# Patient Record
Sex: FEMALE | Race: WHITE | NOT HISPANIC OR LATINO | Employment: OTHER | URBAN - METROPOLITAN AREA
[De-identification: names, ages, dates, MRNs, and addresses within clinical notes are randomized per-mention and may not be internally consistent; named-entity substitution may affect disease eponyms.]

---

## 2017-01-04 ENCOUNTER — ALLSCRIPTS OFFICE VISIT (OUTPATIENT)
Dept: OTHER | Facility: OTHER | Age: 57
End: 2017-01-04

## 2017-01-13 ENCOUNTER — ALLSCRIPTS OFFICE VISIT (OUTPATIENT)
Dept: OTHER | Facility: OTHER | Age: 57
End: 2017-01-13

## 2017-02-01 ENCOUNTER — ALLSCRIPTS OFFICE VISIT (OUTPATIENT)
Dept: OTHER | Facility: OTHER | Age: 57
End: 2017-02-01

## 2017-02-23 ENCOUNTER — ALLSCRIPTS OFFICE VISIT (OUTPATIENT)
Dept: OTHER | Facility: OTHER | Age: 57
End: 2017-02-23

## 2017-04-05 ENCOUNTER — ALLSCRIPTS OFFICE VISIT (OUTPATIENT)
Dept: OTHER | Facility: OTHER | Age: 57
End: 2017-04-05

## 2017-04-19 ENCOUNTER — GENERIC CONVERSION - ENCOUNTER (OUTPATIENT)
Dept: OTHER | Facility: OTHER | Age: 57
End: 2017-04-19

## 2017-05-03 ENCOUNTER — ALLSCRIPTS OFFICE VISIT (OUTPATIENT)
Dept: OTHER | Facility: OTHER | Age: 57
End: 2017-05-03

## 2017-07-03 ENCOUNTER — GENERIC CONVERSION - ENCOUNTER (OUTPATIENT)
Dept: OTHER | Facility: OTHER | Age: 57
End: 2017-07-03

## 2017-07-05 ENCOUNTER — ALLSCRIPTS OFFICE VISIT (OUTPATIENT)
Dept: OTHER | Facility: OTHER | Age: 57
End: 2017-07-05

## 2017-07-06 ENCOUNTER — GENERIC CONVERSION - ENCOUNTER (OUTPATIENT)
Dept: OTHER | Facility: OTHER | Age: 57
End: 2017-07-06

## 2017-07-06 ENCOUNTER — ALLSCRIPTS OFFICE VISIT (OUTPATIENT)
Dept: OTHER | Facility: OTHER | Age: 57
End: 2017-07-06

## 2017-08-02 ENCOUNTER — ALLSCRIPTS OFFICE VISIT (OUTPATIENT)
Dept: OTHER | Facility: OTHER | Age: 57
End: 2017-08-02

## 2017-08-16 ENCOUNTER — GENERIC CONVERSION - ENCOUNTER (OUTPATIENT)
Dept: OTHER | Facility: OTHER | Age: 57
End: 2017-08-16

## 2017-08-24 ENCOUNTER — ALLSCRIPTS OFFICE VISIT (OUTPATIENT)
Dept: OTHER | Facility: OTHER | Age: 57
End: 2017-08-24

## 2017-10-04 ENCOUNTER — ALLSCRIPTS OFFICE VISIT (OUTPATIENT)
Dept: OTHER | Facility: OTHER | Age: 57
End: 2017-10-04

## 2017-10-06 DIAGNOSIS — Z00.00 ENCOUNTER FOR GENERAL ADULT MEDICAL EXAMINATION WITHOUT ABNORMAL FINDINGS: ICD-10-CM

## 2017-10-12 ENCOUNTER — APPOINTMENT (OUTPATIENT)
Dept: LAB | Facility: HOSPITAL | Age: 57
End: 2017-10-12
Payer: MEDICARE

## 2017-10-12 DIAGNOSIS — Z00.00 ENCOUNTER FOR GENERAL ADULT MEDICAL EXAMINATION WITHOUT ABNORMAL FINDINGS: ICD-10-CM

## 2017-10-12 LAB — HCV AB SER QL: NORMAL

## 2017-10-12 PROCEDURE — 86803 HEPATITIS C AB TEST: CPT

## 2017-10-12 PROCEDURE — 36415 COLL VENOUS BLD VENIPUNCTURE: CPT

## 2017-10-13 ENCOUNTER — GENERIC CONVERSION - ENCOUNTER (OUTPATIENT)
Dept: OTHER | Facility: OTHER | Age: 57
End: 2017-10-13

## 2017-10-18 ENCOUNTER — ALLSCRIPTS OFFICE VISIT (OUTPATIENT)
Dept: OTHER | Facility: OTHER | Age: 57
End: 2017-10-18

## 2017-11-01 ENCOUNTER — ALLSCRIPTS OFFICE VISIT (OUTPATIENT)
Dept: OTHER | Facility: OTHER | Age: 57
End: 2017-11-01

## 2017-12-13 ENCOUNTER — GENERIC CONVERSION - ENCOUNTER (OUTPATIENT)
Dept: FAMILY MEDICINE CLINIC | Facility: CLINIC | Age: 57
End: 2017-12-13

## 2018-01-09 NOTE — PSYCH
Progress Note  Psychotherapy Provided [facility]: Group Therapy provided today  Goals addressed in session:   Goal #1  PAIN & POSITIVE COPING Group  D: Pt Homer Carr) was one of 4 pts  who participated in today's "Pain and Positive Coping" Group  Topics explored today included: Introductions ; Confidentiality; Each Group members' Sharing of their level of Pain today,and the Etiology of their Pain Condition(s); Psychoeducation by this Therapist re: the relationship of Pain+Depression+Anxiety; Group Members' [place] Sharing of their current stressors TODAY,and their body+mind Responses to same; Some Positive Coping Strategies they are using today,and their [place] support was given to each other  The last segment of today's group was comprised of Deep-breathing/ Relaxation/Guided Imagery/ and Mindfulness, accompanied by [employer] via CD  Pt Harold Meigs) participated very actively in group,and was quite verbal today  She said her Pain=5, mostly due to her Fibromyalgia today  She stated that it takes a LOT of effort for her to get up in the morning,and to stay up , doing productive activities  Pt was able to give self-affirmation for achieving her goal to get herself ready and to make it to this Group today  (She was a few minutes late,and she apologized to the Group, but she DID make it,never the less ) She said that certain family members have not been understanding of her depression, her panic attacks, her anxiety, and her physical pain, over the years, but she does try to explain to them what is going on  Pt gave support and info to peers, re: the importance of some Social Support ,and what a difference it has made in her life  Pt stated she lives with her sister, helps her sister re: giving rides or support to her sister's son as needed, and that her sister helps pt by providing a roof over pt's head,and some security   Pt also shared how touched she was, this past week, when her car broke down and workers at a nearby [employer] dealership made her feel comfortable, they helped her family member to get to work, they offered to fix her car at a reasonable fee,and they also gave her a loaner-car to use--->pt said it renewed her natalia in humanity, that there are definitely some nice people in this world  This eased-up some of her anxiety and brightened her mood, for some time  Pt appeared more relaxed,after the Deep-Breathing/ Guided Imagery segment of group  Pt had no signs of any SI Perri Sol /AH/ VH at Group  Pt talked to a peer a few minutes after Group, and gave further info and support to her, and both appeared to be in a more positive frame of mind as they left the building  A: Major Depressive Disorder, recurrent, moderate, F33 1; Panic Disorder without Agoraphobia, F41 0; Generalized Anxiety Disorder, F41 10; and Chronic pain/ Fibromyalgia, M79 7  Pt appears to benefit from Group  P: Continue Treatment Plan, Meds,and Individual Psychotherapy, and Group Therapy  Pain Scale and Suicide Risk [facility]: Current Pain Assessment: moderate to severe   On a scale of 0 to 10, the patient rates current pain at 5   Current suicide risk is low   Behavioral Health Treatment Plan [address] [place] [address] [address]: Diagnosis and Treatment Plan explained to patient, patient relates understanding diagnosis and is agreeable to Treatment Plan  Assessment    1  Major depressive disorder, recurrent, moderate (296 32) (F33 1)   2  Panic disorder without agoraphobia (300 01) (F41 0)   3  Generalized anxiety disorder (300 02) (F41 1)   4  Chronic pain (338 29) (G89 29)    Signatures   Electronically signed by : Cecil Weathers MSAPRNPMHCNS-BC;  Apr 5 2017  7:36PM EST                       (Author)
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
## 2018-01-10 NOTE — PSYCH
Treatment Plan Tracking    #1 Treatment Plan not completed within required time limits due to: Other: (Pt participated in Group, only  )            Signatures   Electronically signed by : FLEX ClementNPMHCNOXANA; Jul 5 2017  2:42PM EST                       (Author)

## 2018-01-11 ENCOUNTER — ALLSCRIPTS OFFICE VISIT (OUTPATIENT)
Dept: OTHER | Facility: OTHER | Age: 58
End: 2018-01-11

## 2018-01-11 NOTE — PSYCH
Progress Note  Psychotherapy Provided [facility]: Group Therapy provided today  Goals addressed in session:   Goal #1  PAIN and Positive Coping Group  D: Pt Elda Morales) was one of 3 pts  who participated in today's "Pain and Positive Coping Group " topics explored today included: introductions; Confidentiality; Each Group Member's description of his/ her current level of Pain and the etiology of it; Some Psychoeducation regarding Chronic Pain, Comorbidities of Depression and Anxiety,and Some Positive Coping Strategies, plus Adjunctive Therapies; Each group member's sharing of current stressors, and depression or anxiety they have, plus helpful Coping activities they are doing; [place] Support of each other;and the final segment of Group was comprised of Calm/ Deep-Breathing/ Relaxation/ Guided Imagery / Mindfulness , accompanied by [employer] via CD  Pt Christine Palmer) participated actively in group  She has a very soft-spoken demeanor  Her Pain = 8 today, Fibromyalgia, in her shoulders and upper body mostly  She is dealing with the stressors of being supportive of her elderly 80 yr old parents who have relocated from [place] to [place] (pt lives with them, now,and they help each other),and pt is also being supportive of her sister who is recovering from Spinal surgery  Pt does deep-breathing,and does daily tasks each day  She tries to give a self-affirmation each day  She gave support to another Group member,and she received support  She felt more relaxed at the end of session, she said  A: Major Depressive Disorder, recurrent, moderate, F33 1; Generalized Anxiety Disorder, F41 10; and Fibromyalgia chronic pain  She appears to benefit from Group  P; Continue Treatment Plan, Meds, and Psychotherapy,and Group Therapy  Pain Scale and Suicide Risk [facility]: Current Pain Assessment: moderate to severe   On a scale of 0 to 10, the patient rates current pain at 8   Current suicide risk is low          Behavioral Health Treatment Plan [facility]: Diagnosis and Treatment Plan explained to patient, patient relates understanding diagnosis and is agreeable to Treatment Plan  Assessment    1  Major depressive disorder, recurrent, moderate (296 32) (F33 1)   2  Anxiety (300 00) (F41 9)   3   Fibromyalgia (729 1) (M79 7)    Signatures   Electronically signed by : Rohini Lorenzo MSAPRNPMHCNS-BC; Nov 1 2017  3:23PM EST                       (Author)
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
## 2018-01-11 NOTE — RESULT NOTES
Discussion/Summary   Hep C Ab is negative       Verified Results  (1) HEP C ANTIBODY [id number] 05:43PM Jasmine Leigh Order Number: [id number]     Test Name Result Flag Reference   HEPATITIS C ANTIBODY Non-reactive  Non-reactive
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
## 2018-01-11 NOTE — PSYCH
Progress Note  Psychotherapy Provided [facility]: Individual Psychotherapy 45 minutes provided today  Goals addressed in session:   Goal #1 (See New Treatment Plan, completed )  D: "My mind is in a fog, my memory is not good most of the time, and I don't get sleep"   " I'm depressed and feeling anxious"    [place] returned for Individual Psychotherapy,after having Group yesterday  She has depression, anxiety, memory lapses, fatigue, and pain=5 from fibromyalgia  She denies SI/HI Deann Jumper Roanl Tang [address]  Processes her stressors, especially her sister's irritability with pt's living at sister's house,and the positive and negative stressors of elderly parents' moving back [place] here soon  Pt processes her thoughts, feelings,and behaviors  Listening and support given  Cognitive-reframing done  We also did calm, Deep-breathing/ Mindfulness moments  Completed pt's new Treatment Plan  Pt to try to be helpful to her elderly parents, and they do have finances to support their own needs  Pt to try not to be self-centered, and do one goal per day,and give self-affirmation for same  She continues meds as prescribed by Dr Ben Martinez  A: Major depressive disorder, recurrent moderate, F33 1; Generalized Anxiety Disorder, F41 10; Fibromyalgia/chronic paqin, M79 7 and family stressors; memory lapses  P: Continue Treatment Plan, meds,and Psychotherapy  Pain Scale and Suicide Risk [facility]: Current Pain Assessment: moderate to severe   On a scale of 0 to 10, the patient rates current pain at 5   Current suicide risk is low   Behavioral Health Treatment Plan [address] [place] [address] [address]: Diagnosis and Treatment Plan explained to patient, patient relates understanding diagnosis and is agreeable to Treatment Plan  Assessment    1  Major depressive disorder, recurrent, moderate (296 32) (F33 1)   2  Generalized anxiety disorder (300 02) (F41 1)   3   Chronic pain (338 29) (G89 29)    Signatures   Electronically signed by : Aren Esposito MSAPRNPMHCNS-BC; Jul 7 2017  2:00PM EST                       (Author)    Electronically signed by : Jessenia Carreon MSAPRNPMHCNS-BC; Jul 7 2017  2:00PM EST                       (Author)

## 2018-01-11 NOTE — PSYCH
Behavioral Health Outpatient Intake    Referred By: DR Sharon Grajeda  Intake Questions: there are no developmental disabilities  the patient does not have a hearing impairment  the patient does not have an ICM or CTT  patient is not taking injectable psychiatric medications  Employment: The patient is not employed  Emergency Contact Information:   Emergency Contact: Stalin Pichardo   Relationship to Patient: SISTER   Phone Number: [phone]   Previous Psychiatric Treatment: She has previously been seen by a psychiatrist  Akiko Busby  She has previously been seen by a therapist  2012   History: no  service  She has not had combat service  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 of the [employer] or [employer]  Insurance Subscriber: SNEHA   Primary Insurance: MEDICARE   ID number: [id number]   Secondary Insurance: [employer] [address] [place] [address]   ID number: [id number]   Group number: [id number]         Presenting Problem (in patient's words): PT HAS FIBROMYALGIA, INTERESTED IN PAIN GRP  SEES DOC FOR ANXIETY/DEPRESSION  Substance Abuse: NONE  Previous Treatment: The patient has not been seen here in the past      Accepted as Patient   Mesha Howell 9/29/2016 @ 2:15P      Primary Care Physician:       Signatures   Electronically signed by : Vladimir Bennett, ; Sep  9 2016  9:04AM EST                       (Author)

## 2018-01-12 NOTE — PSYCH
Progress Note  Psychotherapy Provided [facility]: Individual Psychotherapy 50 minutes provided today  Goals addressed in session:   Goal #1  D: " I'm anxious  Donn Goody Donn Goody I'm tight in my breathing and in my body  Donn Goody Donn Goody I'm numb "   Pt Chitra Joseph) has a very anxious, thought-blocking affect  Her mood is depressed  Denies SI /HI/AH /VH  She has multiple somatic complaints of Pain= 8, in her shoulders, her neck,and in her back, and overall "body pain" due to her Fibromyalgia  She has a "deer-in-the-headlights" look to her  She is assisted with calm, Deep-breathing/ Relaxation exercises, before she tries to start talking  She processes her thoughts, feelings,and behaviors  She is feeling overwhelmed with helping her 80 and 80 yr old parents get settled into their new   (mobile) home here in [address] [place] [address], after their re-location from [place]  She states that both her parents took turns getting lost in [place], and her father lost his car keys temporarily,and then when pt  was driving them home to their new place---her sister Jennifer Salinas called her,and was yelling at pt to bring her meds from the drug store, as pt's sister just completed having back/ 3-disc surgery and was in a lot of pain  Pt states, " I lost it and I screamed back at her,and hung up my cell phone while I was driving!" Pt is given Active Listening, Support,and validation, in session  We do Cognitive-reframing,and problem-solving, in session  Pt to do one thing at a time,and give self-affirmations for a daily goal accomplished  She will not use cell phone while driving, and will tell her family same  She will ask her brother and her nephews for more help---they do come over and help, when they are called  Pt to get parents assessed by [address], to see what other help may be available re; home health aide, or housekeeping  She states that both parents are still pretty sharp and quite mobile, but they are adjusting to the new environment, new stores,and new routines   She will have them put keys, and Mom's purse, etc, in one main spot, in their new house,and will put a big calendar up for them  Pt also states, "I want rest!"--->she will try to get extra rest this week at nights  She continues meds as prescribed by doctors  Her sister did apologize to her--->pt to enlist the help of her brother with doing errands for their sister , too, while sister recovers from back surgery  A: Major Depressive disorder, recurrent, moderate, F33 1; Generalized Anxiety disorder, F41 10; Chronic pain and other conditions  P: Continue Treatment Plan, Meds,and Psychotherapy  Pain Scale and Suicide Risk [facility]: Current Pain Assessment: moderate to severe   On a scale of 0 to 10, the patient rates current pain at 8   Current suicide risk is low   Behavioral Health Treatment Plan [address] [place] [address] [address]: Diagnosis and Treatment Plan explained to patient, patient relates understanding diagnosis and is agreeable to Treatment Plan  Assessment    1  Major depressive disorder, recurrent, moderate (296 32) (F33 1)   2  Generalized anxiety disorder (300 02) (F41 1)   3   Chronic pain (338 29) (G89 29)    Signatures   Electronically signed by : DEON He-BC; Oct 18 2017 11:37PM EST                       (Author)    Electronically signed by : BONI HeBC; Oct 18 2017 11:37PM EST                       (Author)
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
## 2018-01-13 NOTE — PSYCH
Progress Note  Psychotherapy Provided [facility]: Group Therapy provided today  Goals addressed in session:   Goal #1  D: Pt Iggy Jeff) was one of 4 pts  who participated in today's "Pain and Positive Coping " Group  Topics explored today included : Introductions; Confidentiality; Each Group Members' Sharing of their level of Pain today and the Etiology of their Pain; Group Peers' mutual Sharing of Information and Support re: What Therapies have helped them the most; Some Psychoeducation provided by this Therapist re: Medications and [facility] Treatments available to treat Pain; Group peers' giving Self-affirmation for daily goals accomplished,and affirmation to each other; and the final segment of today's group was comprised of Deep-Breathing/ Relaxation/ Guided Imagery/ Mindfulness accompanied by [employer] via CD  Estella Florentino participated actively in group  She had a very anxious/ constricted affect, as she described her Fibromyalgia Pain =8 today, throughout her whole body,and a feeling of "heaviness' in her torso, as she deals with the stress of helping to [facility] for her sister who just had Back Surgery,and helping her elderly 80 yr old father and her 80 yr old mother to move into their new small home here in [place], after their recent relocation from [place], [place]  Pt Conchis Patten) was given support and affirmation by her Peers in Group  She gave support to them, also  She also appeared more relaxed after the end of the Deep-breathing/ Guided Imagery segment of Group  She had no evidence of any SI /HI /AH/VH during Group  A: Major Depressive Disorder, recurrent, moderate, F33 1; Fibromyalgia Pain, M79 7; Generalized Anxiety Disorder, F41 10  Pt appeared to benefit from Group  P: Continue Treatment Plan, Meds,and Individual Psychotherapy,and Group Therapy  Pain Scale and Suicide Risk [facility]: Current Pain Assessment: moderate to severe   On a scale of 0 to 10, the patient rates current pain at 8      Current suicide risk is low   Behavioral Health Treatment Plan [facility]: Diagnosis and Treatment Plan explained to patient, patient relates understanding diagnosis and is agreeable to Treatment Plan  Assessment    1  Major depressive disorder, recurrent, moderate (296 32) (F33 1)   2  Fibromyalgia (729 1) (M79 7)   3   Generalized anxiety disorder (300 02) (F41 1)    Signatures   Electronically signed by : Jazmin Will MSAPRNPMHCNS-BC; Oct  4 2017  3:28PM EST                       (Author)
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
## 2018-01-14 NOTE — PSYCH
Treatment Plan Tracking    #1 Treatment Plan not completed within required time limits due to: Client presented with emotional/behavioral issues that required clinical intervention            Signatures   Electronically signed by : Yue Ordonez MSAPRNPMHCNSLexisBC; Oct 28 2016  6:42PM EST                       (Author)

## 2018-01-14 NOTE — PSYCH
Progress Note  Psychotherapy Provided [facility]: Group Therapy provided today  Goals addressed in session:   Goal #1 ---Group Therapy session---  D: Pt was one of 4 pts  who participated in "Pain and Positive Coping "Group  Pt expressed that her Pain =3 today, in her spine and up to and across her shoulders  Pt was supportive of other pts  Pt also shared techniques which help her pain, including Hydrotherapy at a Physical therapy pool, and special heating pads she uses at home, and calm, deep-breathing,and managing her schedule of medical appointments so that she is not overbooked  Pt stated that Adult Coloring Books help her to relax,and some TV shows or movies help, too  Pt has no signs of any SI Kenneth Winters / SHERIF  She continues her meds as prescribed  A: Major Depressive Disorder, recurrent, moderate, F33 1, ANASTACIO, F41 10, and Fibromyalgia, M79 7; Pt appears to benefit from Group therapy  P: Continue Individual Therapy, Group Therapy,and Meds  Pain Scale and Suicide Risk [facility]: Current Pain Assessment: moderate to severe   On a scale of 0 to 10, the patient rates current pain at 3   Current suicide risk is low   Behavioral Health Treatment Plan Khang Phan: Diagnosis and Treatment Plan explained to patient, patient relates understanding diagnosis and is agreeable to Treatment Plan  Assessment    1  Major depressive disorder, recurrent, moderate (296 32) (F33 1)   2  Generalized anxiety disorder (300 02) (F41 1)   3   Fibromyalgia (729 1) (M79 7)    Signatures   Electronically signed by : Karl Palacios MSAPRNPMHCNS-BC; Nov  3 2016 12:31PM EST                       (Author)

## 2018-01-15 NOTE — PSYCH
Progress Note  Psychotherapy Provided [facility]: Individual Psychotherapy 50 minutes provided today  Goals addressed in session:   Goal #1  D: " I went to my brother's yesterday, for our [place] Family Gathering   but it was so loud, boisterous, everyone talking , kids running around, that I couldn't take it---I went to the living room and tried to watch a movie, but then the guys turned on Football,and there went my peace and quiet   "   " My sister Steve Soto is not the easiest to live with---she blames me for anything that is put away or straightened-up"!   "It's alway's "Marjan's fault"       " Now, my elderly parents are saying they want to move here[place] they have been out in [place], [place] for 30 yrs now,and I couldn't make the trip out there to see them---I had too much panic, especially about taking flights  [place] If they come here, I can feel the tension already   they might live here at [facility] also   "   Pt has an anxious, constricted affect  Anxious and depressed affect  No SI /HI/ AH /VH  Pt processes her thoughts, feelings,and behaviors  Pt had difficulty coping with the stress of all the people and noise of [place],and she will have to use her coping skills again, re: [place]  Pt also processes the stress she has, living with sister Steve Soto, as they have different personalities  Pt processes also her internal guilt re: not being able to push-past her panic and anxiety enough to visit her parents , who have been in [place], [place] for 30 yrs  Dad is 80, Mom, 80,and they miss Family, so may move back [place] to this area  Pt is assisted with cognitive-reframing, deep-breathing,and mindfulness---deal with the present, and don't over-worry  Pt's parents MAY live in a senior community here---so pt to try not to get over-anxious at this time  Pt is assisted with deep-breathing/ relaxation  Pt also is almost 56, so SHE could move to a senior Apartment herself, if need be, in the future   Pt continues meds, no side effects  Her Pain=4, in hips and spine today  A: Major depressive Disorder, recurrent, moderate, F33 1; Generalized Anxiety Disorder, F41 10,and chronic pain  P: Continue Psychotherapy, Meds, Deep-breathing/ positive coping,and cognitive-reframing  Pain Scale and Suicide Risk [facility]: Current Pain Assessment: moderate to severe   On a scale of 0 to 10, the patient rates current pain at 4   Current suicide risk is low   Behavioral Health Treatment Plan [facility]: Diagnosis and Treatment Plan explained to patient, patient relates understanding diagnosis and is agreeable to Treatment Plan  Assessment    1  Major depressive disorder, recurrent, moderate (296 32) (F33 1)   2   Generalized anxiety disorder (300 02) (F41 1)    Signatures   Electronically signed by : FERNANDA HoskinsMHCNS-BC; Nov 25 2016  2:43PM EST                       (Author)    Electronically signed by : DEON Hoskins-BC; Nov 25 2016  2:44PM EST                       (Author)

## 2018-01-15 NOTE — PSYCH
Progress Note  Psychotherapy Provided [facility]: Group Therapy provided today  Goals addressed in session:   Goal #1  PAIN and POSITIVE COPING Group  D: Pt was one of 3 Pts who participated in today's " Pain and Positive Coping" Group  topics explored today included: Introductions; Confidentiality; Pts'  Sharing of the Etiology of their Pain,and the Level of Pain they experience today; Pts' Sharing of what treatments have and have not helped to alleviate their current Pain; Some Psychoeducation given by this Therapist regarding alternative Medications (vs  Opioids) which are being prescribed by Physicians/Providers now for both Pain and for Depression and Anxiety, instead of the former automatic opioid medications, in the face of the current Opioid Abuse Epidemic,and effects/ possible side effects of the meds (such as Tricyclic Antidepressants, or Anticonvulsants, or SNRI's, or topical meds such as Lidocaine patch); Pt's [place] Sharing and Support, re: some of these meds which they are on right now, plus injections of corticosteroids, which their Pain Management Physicians are trying , to help relieve their Pain  The final segment of today's session was comprised of Deep-breathing/ Relaxation/ Guided Imagery/ Mindfulness, accompanied by [employer] via CD  Pt Lester Evans) participated actively in Group  She was soft -spoken,and said her Fibromyalgia Pain=4 today, with her neck and shoulders affected most  Pt gave information and support to peers,and said that each patient is different, regarding their response to various treatments, or side effects they may feel  Pt also finds that talking to her good friend often,and mutual Support between them, helps her to BIBER with Pain and depression and anxiety  Pt felt more relaxed at end of session, after the Deep-breathing/ Mindfulness segment  Pt had no evidence of any SI Armida Duran /SHERIF at Group today  A: Major Depressive Disorder, recurrent  moderate, F33 1;  Fibromyalgia pain, M79 7; Panic Disorder, F41 0; Generalized Anxiety Disorder, F41 10; Pt appears to benefit from Group  P: Continue Treatment Plan, Meds, and Individual Psychotherapy and Group Therapy  Pain Scale and Suicide Risk [facility]: Current Pain Assessment: moderate to severe   On a scale of 0 to 10, the patient rates current pain at 4   Current suicide risk is low   Behavioral Health Treatment Plan Windyaustin Garcia: Diagnosis and Treatment Plan explained to patient, patient relates understanding diagnosis and is agreeable to Treatment Plan  Assessment    1  Major depressive disorder, recurrent, moderate (296 32) (F33 1)   2  Fibromyalgia (729 1) (M79 7)   3   Generalized anxiety disorder (300 02) (F41 1)    Signatures   Electronically signed by : TASHIA Roy; May  3 2017  3:01PM EST                       (Author)    Electronically signed by : TASHIA Roy; May  3 2017  3:02PM EST                       (Author)

## 2018-01-16 NOTE — PSYCH
Progress Note  Psychotherapy Provided [facility]: Group Therapy provided today  Goals addressed in session:   Goal #1  D: Liz Castillo was one of 3 clients who participated in today's " Pain and Positive Coping" Group  Topics explored today included: Introductions; Confidentiality; Each Members' Sharing of their Etiology of their Pain and current Level of Pain; [place] Support among group members; some Psychoeducation by this Therapist re: the relationship of Pain, Depression,and Anxiety, and some non-opioid Meds which help with both Pain and Depression and/ or Anxiety; and the final segment of today's Group was comprised of Deep-Breathing/ Relaxation/ Guided Imagery/ Mindfulness accompanied by [employer] via CD  Liz Castillo participated actively in Group session  Her Fibromyalgia/ total body-pain = 5, today  She explained how, even though she loves her elderly parents deeply, their recent 3-week visit was still slightly stressful, as her routine was displaced,and she also is mentally preparing herself for the fact that her parents will be moving back [place] here, within this year,and she will be as supportive as possible  She gave support to peers in [place] she received Support from them  She also shared that she finds that prayer and [address] [place] [address] acquaintances, help her to Saulo Bajwa with her Pain  She had no evidence in Group of any SI /HI /AH/VH  She felt more relaxed at the end of today's Group  A: Major depressive Disorder, Recurrent, moderate, F33 1; Generalized Anxiety Disorder, F41 10; Chronic pain due to Fibromyalgia, M79 7; Liz Castillo appears to benefit from Group  P: Continue Treatment Plan, Meds,and Psychotherapy/ Group Therapy  Pain Scale and Suicide Risk [facility]: Current Pain Assessment: moderate to severe   On a scale of 0 to 10, the patient rates current pain at 5   Current suicide risk is low          Behavioral Health Treatment Plan [address] [place] [address] [address]: Diagnosis and Treatment Plan explained to patient, patient relates understanding diagnosis and is agreeable to Treatment Plan  Assessment    1  Major depressive disorder, recurrent, moderate (296 32) (F33 1)   2  Generalized anxiety disorder (300 02) (F41 1)   3  Chronic pain (338 29) (G89 29)   4   Fibromyalgia (729 1) (M79 7)    Signatures   Electronically signed by : TASHIA Bruno; Jul 5 2017  3:04PM EST                       (Author)    Electronically signed by : TASHIA Bruno; Jul 5 2017  3:04PM EST                       (Author)

## 2018-01-16 NOTE — PSYCH
Progress Note  Psychotherapy Provided [facility]: Group Therapy provided today  Goals addressed in session:   Goal #1  PAIN and POSITIVE COPING GROUP  D: Cristino Raymond was one of 3 pts  who participated in today's "Pain and Positive Coping " Group  Topics explored today included: Introductions; Confidentiality; Pts' descriptions of current Pain levels they experience; Current Stressors and triggers for Pain; some Psychoeducation re: the relationship of Pain+ Stress+Depression+ Anxiety, and medications available which can help relieve both Depression +Pain; [place] Sharing of information, Support,and some Coping Strategies, [id number] in Group; and the final segment of today's Group was comprised of Deep-Breathing/ Relaxation/ Guided Imagery, accompanied by [employer] via CD  Cristino Raymond participated actively in Group  She stated that some time to herself ,listening to Books on Tape, and deep-breathing/ lying on her bed for 45", helps her to distract herself from her Pain  Her pain = 5 today  Pt attempted to offer suggestions to a needy, needy Peer in Group today, but the other pt was negating of pt's input, because the other peer was focussed exclusively on herself today  The other peer apologized at the end of Group, and thanked peers for listening to her today, (This Therapist had to redirect the other peer, at times), and Cristino Raymond seemed to accept the other peer's apology  A: Major Depressive Disorder, recurrent, moderate, F33 1; Generalized Anxiety Disorder, F41 10; and chronic Fibromyalgia Pain, M79 7  P: Continue Treatment Plan, Meds, Individual Psychotherapy, and Group Therapy  Pain Scale and Suicide Risk [facility]: Current Pain Assessment: moderate to severe   On a scale of 0 to 10, the patient rates current pain at 5   Current suicide risk is low          Behavioral Health Treatment Plan Henri Balderrama: Diagnosis and Treatment Plan explained to patient, patient relates understanding diagnosis and is agreeable to Treatment Plan  Assessment    1  Major depressive disorder, recurrent, moderate (296 32) (F33 1)   2  Generalized anxiety disorder (300 02) (F41 1)   3  Fibromyalgia (729 1) (M79 7)   4   Chronic pain (338 29) (G89 29)    Signatures   Electronically signed by : TASHIA Clement; Feb 1 2017  3:17PM EST                       (Author)    Electronically signed by : TASHIA Clement; Feb 1 2017  3:17PM EST                       (Author)

## 2018-01-17 NOTE — PSYCH
Progress Note  Psychotherapy Provided [facility]: Group Therapy provided today  Goals addressed in session:   Goal #1  D: Pt  Chitra Joseph) was one of 4 clients who participated in today's " Pain and Positive Coping Group " Topics explored today included: Introductions; Confidentiality; Each Group member's sharing of the Etiology of their pain and their current Pain Level; Group members' Sharing of their recent stressors,and how these stressors have affected their Pain, plus some Depression + Anxiety; Some Psychoeducation provided by this Therapist; Group Members' Support of each other; Positive Coping Strategies; and the final segment of today's session was comprised of Deep-Breathing/ Relaxation/ Guided-Imagery/ Mindfulness accompanied by [employer] via CD  Betsey Viramontes participated actively in session  Her Fibromyalgia Pain was severe, an 8 today, largely in her shoulders, her upper body,and "all over my body  '  Donn Goody Her mood was anxious and moderately depressed  She gave empathy and support to her peers in Group  She stated that she is bracing herself for her elderly parents to move soon from [place], to [place]---this gives her high anxiety  She expressed she loves her parents, but she will not be physically able to do caretaking of them if they need it---she will need help  She may move in with them, though,as her sister needs a 'respite' from [place] [address] pt Chitra Joseph) said she never does well if she lives alone---she needs the social support and the Security of knowing that someone can help her if she is VERY depressed or in severe Pain  Some cognitive-reframing attempted, in Group---But pt apparently has a lot of dependency-needs  She did receive some support from Group members  She had no SI /HI/ AH /VH  She said she felt better after she had talked in Group today,and she was more relaxed after the Deep-Breathing/ Guided Imagery segment at end of Group    A: Major Depressive Disorder, recurrent, moderate, F33 1; Generalized Anxiety Disorder, F41 10; Fibromyalgia, M79 7; R/O Dependent Personality Disorder  She appears to benefit from Group  P: Continue Meds and Psychotherapy modalities---Individual and Group Therapies  Pain Scale and Suicide Risk [facility]: Current Pain Assessment: moderate to severe   On a scale of 0 to 10, the patient rates current pain at 8   Current suicide risk is low   Behavioral Health Treatment Plan [facility]: Diagnosis and Treatment Plan explained to patient, patient relates understanding diagnosis and is agreeable to Treatment Plan  Assessment    1  Major depressive disorder, recurrent, moderate (296 32) (F33 1)   2  Generalized anxiety disorder (300 02) (F41 1)   3   Chronic pain (338 29) (G89 29)    Signatures   Electronically signed by : Renee Staples MSAPRNPMHCNS-BC; Aug  2 2017  5:23PM EST                       (Author)

## 2018-01-17 NOTE — PSYCH
Treatment Plan Tracking    #1 Treatment Plan not completed within required time limits due to: Client presented with emotional/behavioral issues that required clinical intervention  , Other: (Severe anxiety R/T Thanksgiving yesterday,and family issues today  )            Signatures   Electronically signed by : Jia Ritter MSAPRNPMHCNOXANA; Nov 25 2016  2:25PM EST                       (Author)

## 2018-01-18 NOTE — PSYCH
Progress Note  Psychotherapy Provided [facility]: Group Therapy provided today  Goals addressed in session:   Goal #1  PAIN and POSITIVE COPING GROUP  D: Pt was one of 4 pts  who participated in today's "Pain and Positive Coping" Group  Topics explored today included: Introductions; Confidentiality; Patients' Sharing their current level of Pain and the orifins of it; Pt's mutual sharing of info re: treatments which have helped or hindered their progress in handling their pain; Some Psychoeducation provided by this facilitator re: the Comorbidities frequently seen regarding Pain, Depression,and Anxiety; Some medications used to treat both Depression and Pain; Pts' sharing of Info re: what meds have helped them the most; and the final segment of today's session was comprised of Deep-breathing/Relaxation/ Guided Imagery/ mindfulness, accompanied by PAK via CD  Pt participated quietly , showing listening and support to peers, but also sharing that her Fibromyalgia Pain= 4 today  Pt said that she tried several treatments over the years, but her current Hydrotherapy---swimming and special water-exercises--is helpful,and she is re-starting it weekly, since the [place] are completed  Pt had increased Pain, during Holiday gatherings in the family, but she coped with the noise, commotion,and pain the best she could  Pt stated that she has felt like a lab guinea-pig, when doctors have tried different meds for her over the years, but she is hopeful that she is getting to a better combination of meds, currently  Pt was very Relaxed after the Deep-breathing/Relaxation segment, at the end of session, and hoped to "keep the peacefulness within" herself, for the remainder of the day  Pt had no evidence of any SI /HI Shon Tanya /VH  A: Major Depressive Disorder, Recurrent, moderate, F33 1; Generalized Anxiety Disorder, F41 10; and Fibromyalgia, M79 7    P: Continue Treatment Plan, Medications, Individual Psychotherapy,and Group Therapy  Pain Scale and Suicide Risk [facility]: Current Pain Assessment: moderate to severe   On a scale of 0 to 10, the patient rates current pain at 4   Current suicide risk is low   Behavioral Health Treatment Plan [facility]: Diagnosis and Treatment Plan explained to patient, patient relates understanding diagnosis and is agreeable to Treatment Plan  Assessment    1  Major depressive disorder, recurrent, moderate (296 32) (F33 1)   2  Generalized anxiety disorder (300 02) (F41 1)   3   Fibromyalgia (729 1) (M79 7)    Signatures   Electronically signed by : Chary Mosquera MSAPRNPMHCNS-BC; Jan 4 2017  7:46PM EST                       (Author)
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
## 2018-01-23 VITALS
BODY MASS INDEX: 23.95 KG/M2 | RESPIRATION RATE: 16 BRPM | TEMPERATURE: 97.2 F | DIASTOLIC BLOOD PRESSURE: 58 MMHG | SYSTOLIC BLOOD PRESSURE: 88 MMHG | HEIGHT: 68 IN | HEART RATE: 76 BPM | WEIGHT: 158 LBS

## 2018-02-04 DIAGNOSIS — M79.7 FIBROMYALGIA: Primary | ICD-10-CM

## 2018-02-05 RX ORDER — AMITRIPTYLINE HYDROCHLORIDE 10 MG/1
TABLET, FILM COATED ORAL
Qty: 90 TABLET | Refills: 0 | Status: SHIPPED | OUTPATIENT
Start: 2018-02-05 | End: 2018-05-05 | Stop reason: SDUPTHER

## 2018-03-19 ENCOUNTER — TELEPHONE (OUTPATIENT)
Dept: BEHAVIORAL/MENTAL HEALTH CLINIC | Facility: CLINIC | Age: 58
End: 2018-03-19

## 2018-03-19 NOTE — TELEPHONE ENCOUNTER
Patient called and seemed very confused from the beginning of the conversation  She canceled her remaining group appointments ad was unsure if she should reschedule them  She then asked if she has to see you for individual session, then I asked her if she wanted to continue therapy  As stated before, she seemed very confused and unsure what to do  I asked her if it would be okay for you to call her and she said yes

## 2018-03-20 ENCOUNTER — TELEPHONE (OUTPATIENT)
Dept: BEHAVIORAL/MENTAL HEALTH CLINIC | Facility: CLINIC | Age: 58
End: 2018-03-20

## 2018-03-24 DIAGNOSIS — M54.5 CHRONIC LOW BACK PAIN, UNSPECIFIED BACK PAIN LATERALITY, WITH SCIATICA PRESENCE UNSPECIFIED: Primary | ICD-10-CM

## 2018-03-24 DIAGNOSIS — G89.29 CHRONIC LOW BACK PAIN, UNSPECIFIED BACK PAIN LATERALITY, WITH SCIATICA PRESENCE UNSPECIFIED: Primary | ICD-10-CM

## 2018-03-24 RX ORDER — NABUMETONE 500 MG/1
TABLET, FILM COATED ORAL
Qty: 30 TABLET | Refills: 2 | Status: SHIPPED | OUTPATIENT
Start: 2018-03-24

## 2018-04-12 ENCOUNTER — OFFICE VISIT (OUTPATIENT)
Dept: BEHAVIORAL/MENTAL HEALTH CLINIC | Facility: CLINIC | Age: 58
End: 2018-04-12
Payer: MEDICARE

## 2018-04-12 DIAGNOSIS — F33.41 MAJOR DEPRESSIVE DISORDER, RECURRENT, IN PARTIAL REMISSION (HCC): Primary | ICD-10-CM

## 2018-04-12 DIAGNOSIS — F41.1 GENERALIZED ANXIETY DISORDER: ICD-10-CM

## 2018-04-12 PROCEDURE — 90832 PSYTX W PT 30 MINUTES: CPT | Performed by: REGISTERED NURSE

## 2018-04-12 NOTE — PATIENT INSTRUCTIONS
Generalized Anxiety Disorder   WHAT YOU NEED TO KNOW:   General anxiety disorder (ANASTACIO) is a condition that causes you to worry more than normal  It also causes you to feel fear in most situations  You are worried or afraid even without a cause  You may worry about your health, job, money, and relationships  It is hard for you to control your worry and feel calm  ANASTACIO prevents you from doing daily activities  It may also prevent you from spending time with family and friends  Without treatment, your anxiety may get worse  DISCHARGE INSTRUCTIONS:   Call 911 for any of the following:   · You have chest pain, tightness, or heaviness that may spread to your shoulders, arms, jaw, neck, or back  · You feel like hurting yourself or someone else  Contact your healthcare provider if:   · Your symptoms get worse or do not get better with treatment  · You have new symptoms since your last visit  · You have questions or concerns about your condition or care  Medicines:   · Medicines  help you feel more calm and relaxed, and decrease your symptoms  · Take your medicine as directed  Contact your healthcare provider if you think your medicine is not helping or if you have side effects  Tell him of her if you are allergic to any medicine  Keep a list of the medicines, vitamins, and herbs you take  Include the amounts, and when and why you take them  Bring the list or the pill bottles to follow-up visits  Carry your medicine list with you in case of an emergency  Manage anxiety:   · Talk to someone about your anxiety  Your healthcare provider may suggest counseling  Cognitive behavioral therapy can help you understand and change how you react to events  It can also help you understand what triggers your symptoms  You might feel more comfortable talking with a friend or family member about your anxiety  Choose someone you know will be supportive and encouraging  · Keep a journal of your symptoms    Write down what you were doing before your symptoms started  Also write down what made the anxiety better or worse  Bring this journal with you to your follow-up appointments  · Find ways to relax  Activities such as yoga, meditation, or listening to music can help you relax  Spend time with friends, or do things you enjoy  · Practice deep breathing  Deep breathing can help you relax when you feel anxious  Focus on taking slow, deep breaths several times a day, or during an anxiety attack  Slowly breathe in through your nose  Pause, then slowly breathe out through your mouth  Try to breathe out longer than you breathed in  · Create a regular sleep routine  Regular sleep can help you feel calmer during the day  Go to sleep and wake up at the same times every day  Do not watch television or use the computer right before bed  Your room should be comfortable, dark, and quiet  · Eat a variety of healthy foods  Healthy foods include fruits, vegetables, low-fat dairy products, lean meats, fish, whole-grain breads, and cooked beans  Healthy foods can help you feel less anxious and have more energy  · Exercise regularly  Exercise can increase your energy level  Exercise may also lift your mood and help you sleep better  Your healthcare provider can help you create an exercise plan  · Do not smoke  Nicotine and other chemicals in cigarettes and cigars can increase anxiety  Ask your healthcare provider for information if you currently smoke and need help to quit  E-cigarettes or smokeless tobacco still contain nicotine  Talk to your healthcare provider before you use these products  · Do not have caffeine  Caffeine can make your symptoms worse  Do not have foods or drinks that are meant to increase your energy level  · Limit or do not drink alcohol  Ask your healthcare provider if alcohol is safe for you  Also ask how much is safe   You may not be able to drink alcohol if you take certain anxiety or depression medicines  · Do not use drugs  Drugs can make your anxiety worse  It can also make anxiety hard to manage  Talk to your healthcare provider if you use drugs and want help to quit  Follow up with your healthcare provider as directed:  Write down your questions so you remember to ask them during your visits  © 2017 [facility] Information is for End User's use only and may not be sold, redistributed or otherwise used for commercial purposes  All illustrations and images included in CareNotes® are the copyrighted property of A D A M , Inc  or [employer]  The above information is an  only  It is not intended as medical advice for individual conditions or treatments  Talk to your doctor, nurse or pharmacist before following any medical regimen to see if it is safe and effective for you  Depression   AMBULATORY CARE:   Depression  is a medical condition that causes feelings of sadness or hopelessness that do not go away  Depression may cause you to lose interest in things you used to enjoy  These feelings may interfere with your daily life  Common symptoms include the following:   · Appetite changes, or weight gain or loss    · Trouble going to sleep or staying asleep, or sleeping too much    · Fatigue or lack of energy    · Feeling restless, irritable, or withdrawn    · Feeling worthless, hopeless, discouraged, or guilty    · Trouble concentrating, remembering things, doing daily tasks, or making decisions    · Thoughts about hurting or killing yourself  Call 911 for any of the following:   · You think about harming yourself or someone else  Contact your healthcare provider if:   · Your symptoms do not improve  · You cannot make it to your next appointment  · You have new symptoms  · You have questions or concerns about your condition or care  Treatment for depression  may include medicine to improve or balance your mood   Therapy may also be used to treat your depression  A therapist will help you learn to cope with your thoughts and feelings  Therapy can be done alone or in a group  It may also be done with family members or a significant other  Self-care:   · Get regular physical activity  Try to exercise for 30 minutes, 3 to 5 days a week  Work with your healthcare provider to develop an exercise plan that you enjoy  Physical activity may improve your symptoms  · Get enough sleep  Create a routine to help you relax before bed  You can listen to music, read, or do yoga  Try to go to bed and wake up at the same time every day  Sleep is important for emotional health  · Eat a variety of healthy foods from all of the food groups  A healthy meal plan is low in fat, salt, and added sugar  Ask your healthcare provider for more information about a meal plan that is right for you  · Do not drink alcohol or use drugs  Alcohol and drugs can make your symptoms worse  Follow up with your healthcare provider as directed: Your healthcare provider will monitor your progress at follow-up visits  He or she will also monitor your medicine if you take antidepressants  Your healthcare provider will ask if the medicine is helping  Tell him or her about any side effects or problems you may have with your medicine  The type or amount of medicine may need to be changed  Write down your questions so you remember to ask them during your visits  © 2017 [employer] [employer] Information is for End User's use only and may not be sold, redistributed or otherwise used for commercial purposes  All illustrations and images included in CareNotes® are the copyrighted property of A D A M , Inc  or [employer] Hooper  The above information is an  only  It is not intended as medical advice for individual conditions or treatments   Talk to your doctor, nurse or pharmacist before following any medical regimen to see if it is safe and effective for you

## 2018-04-12 NOTE — PSYCH
Psychotherapy Provided: Individual Psychotherapy for 30 minutes  Length of time in session: 30 minutes, follow up in  2 weeks    Goals addressed in session:  Goal #1     Pain:  4,  Hips, and Lower Spine  D: "I have some depression, low energy, and I feel  'doom and gloom'  some days,and it lasts a couple of days "   " I do try to do some things, each day, but it is a big push---If I push myself too hard, I pay for it later,with more Pain"    " I have pain every day, but I can't come to Pain Group because I have been helping my sister,and have been there for my parents---Mom is 90, Dad is 92"   "My parents live with me   "'      Pt has an anxious , thought-blocking affect  Her mood is moderately depressed  She has low energy, low motivation, and Chronic Pain  Denies SI/HI/AH/ She is being evaluated for low thyroid--has follow-up tests  She had an corticosteroid injection 2-to 3 weeks ago, in lower Left spine and gluteal area---it helped her muscles to relax slightly,and she had a little less pain  Pt processes her stressors,and her thoughts, feelings,and behaviors  She helped her sister,who had major Back Surgery---pt is thankful that she does not have to live with sister Ayan Gasca) anymore,because she and sister had some conflicts  Pt lives with her elderly parents, helps them, they help her, and pt makes time to help, and do errands for, her sister  parents  Pt is assisted with calm, Deep-breathing,and with positive Cognitive-reframing  about herself, and about her support of family members  Pt to do one goal per day, and give self-affirmations  Will increase her Walking, in the Spring  Pt talks to her friend All Snowdenmadina, and they visit each other---today's [place] they will celebrate with All Ayala and [address]   Pt attends Islam,with parents,and she feels some extra strength with Prayer and Yarsani/ Mass support  A: Depression, Anxiety,and Chronic pain, including Fibromyalgia    P: Continue Treatment Plan, Meds, and Psychotherapy, and positive Coping strategies  PAIN SCALE=4    Current suicide risk : Low  Behavioral Health Treatment Plan [facility] [address]: Diagnosis and Treatment Plan explained to Patience Walsh relates understanding diagnosis and is agreeable to Treatment Plan  YES

## 2018-05-05 DIAGNOSIS — M79.7 FIBROMYALGIA: ICD-10-CM

## 2018-05-06 RX ORDER — AMITRIPTYLINE HYDROCHLORIDE 10 MG/1
TABLET, FILM COATED ORAL
Qty: 90 TABLET | Refills: 0 | Status: SHIPPED | OUTPATIENT
Start: 2018-05-06 | End: 2018-08-07 | Stop reason: SDUPTHER

## 2018-05-16 ENCOUNTER — OFFICE VISIT (OUTPATIENT)
Dept: BEHAVIORAL/MENTAL HEALTH CLINIC | Facility: CLINIC | Age: 58
End: 2018-05-16
Payer: MEDICARE

## 2018-05-16 DIAGNOSIS — G89.4 CHRONIC PAIN SYNDROME: ICD-10-CM

## 2018-05-16 DIAGNOSIS — F41.1 GENERALIZED ANXIETY DISORDER: ICD-10-CM

## 2018-05-16 DIAGNOSIS — F33.41 MAJOR DEPRESSIVE DISORDER, RECURRENT, IN PARTIAL REMISSION (HCC): Primary | ICD-10-CM

## 2018-05-16 PROCEDURE — 90834 PSYTX W PT 45 MINUTES: CPT | Performed by: PSYCHIATRY & NEUROLOGY

## 2018-05-16 NOTE — PATIENT INSTRUCTIONS
Generalized Anxiety Disorder   WHAT YOU NEED TO KNOW:   General anxiety disorder (ANASTACIO) is a condition that causes you to worry more than normal  It also causes you to feel fear in most situations  You are worried or afraid even without a cause  You may worry about your health, job, money, and relationships  It is hard for you to control your worry and feel calm  ANASTACIO prevents you from doing daily activities  It may also prevent you from spending time with family and friends  Without treatment, your anxiety may get worse  DISCHARGE INSTRUCTIONS:   Call 911 for any of the following:   · You have chest pain, tightness, or heaviness that may spread to your shoulders, arms, jaw, neck, or back  · You feel like hurting yourself or someone else  Contact your healthcare provider if:   · Your symptoms get worse or do not get better with treatment  · You have new symptoms since your last visit  · You have questions or concerns about your condition or care  Medicines:   · Medicines  help you feel more calm and relaxed, and decrease your symptoms  · Take your medicine as directed  Contact your healthcare provider if you think your medicine is not helping or if you have side effects  Tell him of her if you are allergic to any medicine  Keep a list of the medicines, vitamins, and herbs you take  Include the amounts, and when and why you take them  Bring the list or the pill bottles to follow-up visits  Carry your medicine list with you in case of an emergency  Manage anxiety:   · Talk to someone about your anxiety  Your healthcare provider may suggest counseling  Cognitive behavioral therapy can help you understand and change how you react to events  It can also help you understand what triggers your symptoms  You might feel more comfortable talking with a friend or family member about your anxiety  Choose someone you know will be supportive and encouraging  · Keep a journal of your symptoms    Write down what you were doing before your symptoms started  Also write down what made the anxiety better or worse  Bring this journal with you to your follow-up appointments  · Find ways to relax  Activities such as yoga, meditation, or listening to music can help you relax  Spend time with friends, or do things you enjoy  · Practice deep breathing  Deep breathing can help you relax when you feel anxious  Focus on taking slow, deep breaths several times a day, or during an anxiety attack  Slowly breathe in through your nose  Pause, then slowly breathe out through your mouth  Try to breathe out longer than you breathed in  · Create a regular sleep routine  Regular sleep can help you feel calmer during the day  Go to sleep and wake up at the same times every day  Do not watch television or use the computer right before bed  Your room should be comfortable, dark, and quiet  · Eat a variety of healthy foods  Healthy foods include fruits, vegetables, low-fat dairy products, lean meats, fish, whole-grain breads, and cooked beans  Healthy foods can help you feel less anxious and have more energy  · Exercise regularly  Exercise can increase your energy level  Exercise may also lift your mood and help you sleep better  Your healthcare provider can help you create an exercise plan  · Do not smoke  Nicotine and other chemicals in cigarettes and cigars can increase anxiety  Ask your healthcare provider for information if you currently smoke and need help to quit  E-cigarettes or smokeless tobacco still contain nicotine  Talk to your healthcare provider before you use these products  · Do not have caffeine  Caffeine can make your symptoms worse  Do not have foods or drinks that are meant to increase your energy level  · Limit or do not drink alcohol  Ask your healthcare provider if alcohol is safe for you  Also ask how much is safe   You may not be able to drink alcohol if you take certain anxiety or depression medicines  · Do not use drugs  Drugs can make your anxiety worse  It can also make anxiety hard to manage  Talk to your healthcare provider if you use drugs and want help to quit  Follow up with your healthcare provider as directed:  Write down your questions so you remember to ask them during your visits  © 2017 [address]  Information is for End User's use only and may not be sold, redistributed or otherwise used for commercial purposes  All illustrations and images included in CareNotes® are the copyrighted property of A D A M , Inc  or [employer] Hooper  The above information is an  only  It is not intended as medical advice for individual conditions or treatments  Talk to your doctor, nurse or pharmacist before following any medical regimen to see if it is safe and effective for you  Depression   AMBULATORY CARE:   Depression  is a medical condition that causes feelings of sadness or hopelessness that do not go away  Depression may cause you to lose interest in things you used to enjoy  These feelings may interfere with your daily life  Common symptoms include the following:   · Appetite changes, or weight gain or loss    · Trouble going to sleep or staying asleep, or sleeping too much    · Fatigue or lack of energy    · Feeling restless, irritable, or withdrawn    · Feeling worthless, hopeless, discouraged, or guilty    · Trouble concentrating, remembering things, doing daily tasks, or making decisions    · Thoughts about hurting or killing yourself  Call 911 for any of the following:   · You think about harming yourself or someone else  Contact your healthcare provider if:   · Your symptoms do not improve  · You cannot make it to your next appointment  · You have new symptoms  · You have questions or concerns about your condition or care  Treatment for depression  may include medicine to improve or balance your mood   Therapy may also be used to treat your depression  A therapist will help you learn to cope with your thoughts and feelings  Therapy can be done alone or in a group  It may also be done with family members or a significant other  Self-care:   · Get regular physical activity  Try to exercise for 30 minutes, 3 to 5 days a week  Work with your healthcare provider to develop an exercise plan that you enjoy  Physical activity may improve your symptoms  · Get enough sleep  Create a routine to help you relax before bed  You can listen to music, read, or do yoga  Try to go to bed and wake up at the same time every day  Sleep is important for emotional health  · Eat a variety of healthy foods from all of the food groups  A healthy meal plan is low in fat, salt, and added sugar  Ask your healthcare provider for more information about a meal plan that is right for you  · Do not drink alcohol or use drugs  Alcohol and drugs can make your symptoms worse  Follow up with your healthcare provider as directed: Your healthcare provider will monitor your progress at follow-up visits  He or she will also monitor your medicine if you take antidepressants  Your healthcare provider will ask if the medicine is helping  Tell him or her about any side effects or problems you may have with your medicine  The type or amount of medicine may need to be changed  Write down your questions so you remember to ask them during your visits  © 2017 [address] [employer] Information is for End User's use only and may not be sold, redistributed or otherwise used for commercial purposes  All illustrations and images included in CareNotes® are the copyrighted property of [employer] M , Inc  or [employer]  The above information is an  only  It is not intended as medical advice for individual conditions or treatments   Talk to your doctor, nurse or pharmacist before following any medical regimen to see if it is safe and effective for you

## 2018-05-16 NOTE — PSYCH
Psychotherapy Provided:  Individual Psychotherapy for 45 minutes  Length of time in session: 45 minutes, follow up in 3 WEEKS    Goals addressed in session:  Goal #1 ( See Treatment Plan, completed today )    Pain:  7,  Back, Neck, Shoulders, Elbows, and Legs  D: " I feel exhausted most of the time  '  Lynita Ped Lynita Ped Lynita Ped "My Pain is constant, every day  Lynita Ped Lynita Ped I am taking a prescribed pack  of some steroid meds for my Back and Neck, and to decrease the pressure in my head /my sinuses, I think"   "I don't remember names of people, or names of my  meds, very easily   " " I write reminders for myself "     " I live with my parents, and my Mom ,90,is forgetful, and my Yenlydebo Sesay,  is pretty steady but he does get frustrated when my Mom forgets things "  Lynita Ped  " My mom has brought laughter into my life---she has a good sense of humor,and we laugh about things when we are cooking together "      Pt Leila Tsai ) returns for psychotherapy, after a brief hiatus   She has valente anxious affect  Mood is mildly depressed  Denies SI/HI/AH/VH  She has thought-blocking, with high anxiety  (A  Support Staff member said pt had been sounding confused on the phone,and indecisive---so pt was offered this session,and she is glad that she took the opening ) pt lives with her elderly parents,and she helps them ---they all help each other, actually  Parents were in [place] many uny 7 enjoyed their independence---they recently moved back here to [place]  Pt  Is assisted with Calm, Deep-breathing, before she launches into therapeutic interaction, today  She then processes her thoughts,feelings,and behaviors  She is somatically-pre-occupied  She processes Mortality issues, as her elderly parents remind her of human Mortality  Active listening,Support,and validation given  We do some Cognitive-reframing,and pt is very thankful for positives in her life ---her loving family members, her friend "Davida Espino",  and her home, food, clothing, and medical care   Pt has Dependent Personality features  Pt to do one goal per day,and give self-affirmation  We do her New Treatment Plan  She takes her meds as prescribed by psychiatrist  Dr Gonzalez Pair his team  She is offered Pain Group again---but she has too many other appointments for herself and for her elderly parents, and cannot commit to attendance at this time  A: Major Depressive disorder, recurrent, moderate, F33 1; Generalized Anxiety Disorder, F41 10; memory lapses; Chronic Pain and other conditions  P: Continue  New Treatment Plan, Meds, and Psychotherapy  PAIN SCALE=7    Current suicide risk :  Low  Behavioral Health Treatment Plan [facility]: Diagnosis and Treatment Plan explained to Arnol Yanez relates understanding diagnosis and is agreeable to Treatment Plan  YES

## 2018-05-16 NOTE — PSYCH
[place] Dates  1960       Date of Initial Treatment Plan:  5/ 16/18 (For this Episode )    Date of Current Treatment Plan: 05/16/18    Treatment Plan Number  #1 ( for this Episode )     Strengths/Personal Resources for Self Care:  'I knew to call here ,when I just wasn't feeling well " I try to take care of my Physical and Mental health  I take my medications as prescribed by my doctors  I give emotional support, and help, to my elderly parents, who are  80 and 80 yrs old  Diagnosis:       Major Depressive Disorder, recurrent, moderate, F33 1      Generalized Anxiety Disorder, F41 10      Chronic Pain Syndrome    Area of Needs:  Marjan's Depression, Anxiety,and weight gain of 10 lbs  Long Term Goal 1: I will have less depression, less anxiety, and lose 10lbs/ have a healthy diet and exercise  Target Date: 9/ 16/ 18  Completion Date:          Short Term Objectives for Goal 1:  I participate in Psychotherapy  I take meds as prescribed by my Psychiatrist Dr Darell Taylor and his team  In Therapy, I identify my stressors  I process my thoughts, feelings,and behaviors  I do Cognitive-reframing , focusing on some positives in her life, instead of automatic-negative thoughts    I do one goal per day, such as cook dinner with my mother,and give self-affirmation  Other Positive Coping Activities I do include; Calm, Deep-breathing/ Mindfulness,  Hydrotherapy/ Water therapy in the community pool;  Prayer; Humor---I bring humor to my Mom,and she brings humor into my life; Or I call a friend Elvira Fabian, or call I my sister  I am proud of my nephew Soto---I have been supportive of him during his school years,and he is qualified to take college courses while he is in high school  I eat a healthy diet---I will lose 10 lbs within the next 4 months  GOAL 1: Modality:  Individual Psychotherapy 2 to 3 times a month                                     Groups: Pain Group offered, but pt is unable at this time                                    Medication Management : Once a month  Behavioral Health Treatment Plan [facility]: Diagnosis and Treatment Plan explained to Alejandrina Sterling relates understanding diagnosis and is agreeable to Treatment Plan  Yes         Client Comments : Please share your thoughts, feelings, need and/or experiences regarding your treatment plan:       __________________________________________________________________    __________________________________________________________________    __________________________________________________________________    __________________________________________________________________    _______________________________________                Patient signature, Date Time: __________________________________________             Physician cosigner signature, Date, Time: ________________________________

## 2018-07-18 ENCOUNTER — OFFICE VISIT (OUTPATIENT)
Dept: BEHAVIORAL/MENTAL HEALTH CLINIC | Facility: CLINIC | Age: 58
End: 2018-07-18
Payer: MEDICARE

## 2018-07-18 DIAGNOSIS — F43.21 GRIEF REACTION: ICD-10-CM

## 2018-07-18 DIAGNOSIS — F33.41 MAJOR DEPRESSIVE DISORDER, RECURRENT, IN PARTIAL REMISSION (HCC): Primary | ICD-10-CM

## 2018-07-18 DIAGNOSIS — F41.1 GENERALIZED ANXIETY DISORDER: ICD-10-CM

## 2018-07-18 PROBLEM — F43.20 GRIEF REACTION: Status: ACTIVE | Noted: 2018-07-18

## 2018-07-18 PROCEDURE — 90834 PSYTX W PT 45 MINUTES: CPT | Performed by: REGISTERED NURSE

## 2018-07-18 NOTE — PSYCH
Psychotherapy Provided:  Individual Psychotherapy for 45 minutes  Length of time in session: 45 minutes, follow up in 2 WEEKS  Goals addressed in session:  Goal #1     Pain:  8, Legs, Elbows, and Back pain;  Fibromyalgia "whole body pain "    D: " I am tired  Lizzy Zarate Very fatigued"   " My own legs feel 'heavier', and I have Pain so many places   the heat and humidity make it worse, this summer   "     " I worry about my Mom---her legs have a lot of pain,and her mobility is down---She needs a new scooter,so I called her insurance to see if the Doctor can order a new one "   " My parents are in their 80's, but they do keep going strong, thank roro "      " It's my good friend 'Hailey Strauss' who I am REALLY worried about"   Lizzy Zarate "She had double mastectomies for Breast Cancer, years ago,and now she has inoperable tumors in her brain "   " She is losing her vision, and she is on Chemotherapy now    she is visibly losing her hair , more every day,and she feels weaker  "  Lizzy Zarate " I feel really, really sad about her  "   " My biggest worry, the worst that could happen? She may lose her Life, to this Cancer "   ( tearful)      Pt Hilario Daniels) has a very anxious affect  She has thought-blocking at times, with high anxiety  She has a deer-in-the -headlights affect, at first, in session, but then she is slightly more animated, as the therapy session progresses  She denies any SI/HI/AH/VH  Her sleep is variable, with her worry and anxiety  She has severe Chronic pain ,and is somatically-preoccupied, most days  She takes  all meds as prescribed,and she goes to Massage therapy every 3 weeks  Christine Lozano processes her stressors  She is living with and helping to care for her elderly parents, both in their 80's  Her Mother's elderly sister , pt's 'Chet Adams', came to visit---pt picked her up from [employer] last evening---> pt states she had not seen her for 30 years, and pt is pleased to have the added visitor for her parents   Pt processes her thoughts, feelings,and behaviors  Processes particularly her sadness and Anticipatory Grief, re: her good friend "Ritu", 61, has Chemotherapy for likely metastasized Cancer, spreading in the brain  Pt processes her years of friendship with Catalina Nunes, and that she may lose her  Grief Therapy is given  Pt decides that she will "do more than I did for my sister Erendira Lee, when Erendira Lee had a very hard time---I am going to give more Emotional Support," pt says  She bought some cheerful thoughtful cards to write and to give her, and pt says she will visit her every couple weeks,and she is praying for her  Pt is given Support, and Validation as a person, in session  We review Calm, Deep-Breathing/ Relaxation/ Mindfulness ,and pt to do at home each day  She is able to do some realistic Cognitive-reframing,and give positive self-affirmations, for herself as a person, and affirmation is given to her , in session, for her Empathy and Generosity she is showing towards her friend "Catalina Nunes', and for her parents and sister and others in her life  Her quiet presence and prayers are most likely helpful to them, pt acknowledges  Pt is invited to attend Pain Group again, August 1st 1pm---she will try to attend,if possible  A: Major Depressive Disorder, recurrent, partial remission, F33 41; Generalized anxiety disorder, F41 10; Anticipatory Grief; and Chronic Pain conditions  P: Continue Treatment Plan, Meds,and Psychotherapy  PAIN SCALE=6    Current suicide risk : Low  Behavioral Health Treatment Plan [facility] Replaced by [facility]: Diagnosis and Treatment Plan explained to Tevin Núñez relates understanding diagnosis and is agreeable to Treatment Plan  YES

## 2018-07-18 NOTE — PATIENT INSTRUCTIONS
Grief and Loss   WHAT YOU NEED TO KNOW:   What is grief? Grief involves feelings of sadness and suffering after the loss of a loved one  It is a normal and healthy emotional response to a loss  What are the stages of grief? · Shock, numbness, and denial:  Even if the death of a loved one was expected, it may still come as a shock  Shock may leave you feeling numb emotionally, which may last for hours to days  You may also find it hard to accept that someone close to you has   · Yearning and searching:  During this time, you may get angry easily and feel anxious  You may try to hold onto the memories of the person who   You may feel guilty because of unfinished business at the time of his death  You may not have said all the things you want to say to your loved one  You may feel guilty for being the one who is still alive  · Disorganized and despair:  You may feel confused, lonely, and depressed  You may feel as though the pain and despair will not go away  There may be times that you separate yourself from your family or friends  · Reorganization:  As time passes, you may learn to accept the changes in your life  You may finally be able to say goodbye to the person  What are the signs and symptoms of grief? The loss and death of a person may cause shock and confusion at first  You may need time to go over and over the events around the death  You may think that mistakes were made, and feel guilty or angry  You may experience any of the following after the death of a loved one:  · Feeling worthless, hopeless, or helpless     · Constant tiredness, frequent crying, and difficulty enjoying things or having fun    · The need to hold onto the person's memories, such as his clothing or other belongings    · Difficulty thinking, concentrating, or making decisions    · Problems eating, such as poor appetite or overeating    · Sleeping too much or too little  What can I do to cope with the loss?   The pain of the grief process can be difficult  You may feel angry, sad, or confused  Anything that might remind you of the loss can trigger these feelings  Events, anniversaries of special times, birthdays, and holidays may also bring these emotions  The following may help you cope with the death of a loved one:  · Give yourself plenty of time and rest:  Allow yourself time to heal  Grief is not something you can rush  It may take years to heal from your loss  Ask your family, friends, and healthcare providers for help  · Share your thoughts and feelings:  Try saying what you really feel or share stories of the one who just passed away  Often just talking to someone you trust, or crying when you need to can be a big help  · Seek hospice services:  Hospice services work with the person during his remaining days, and also help the person's loved ones  Hospice care prepares you for your loss and offers continued help through grief programs after the person's death  Healthcare providers provide support for survivors, and check if grief counseling or psychiatric help are needed  These services give support through sad times after the death  How will I know if I am unable to cope with the loss? You may be having a difficult time with the loss of your loved one if:  · Your sadness and grief continue for a long time  · Your grief is delayed or very strong  · You hide your true feelings or pretend that everything is okay  · You begin to fail in relationships, job, or school  · You start behaving recklessly, such as abusing drugs or drinking alcohol heavily  Where can I find support and more information? · [address] [place] [address], [address] [place] [address]  Phone: [phone]  Web Address: [email or web address]  When should I contact my healthcare provider? · You cannot eat, drink, or take your medicines      · You feel depressed or sad most of the time, or these feelings do not go away  · You need to talk about your problems and feelings  · You have questions or concerns about your condition or care  When should I seek immediate care? · You feel like hurting yourself or someone else  · You are anxious or restless even after you take your medicines  · You feel that you cannot cope with your condition  · You have problems sleeping  · You have trouble breathing, chest pain, or a fast heartbeat  CARE AGREEMENT:   You have the right to help plan your care  Learn about your health condition and how it may be treated  Discuss treatment options with your caregivers to decide what care you want to receive  You always have the right to refuse treatment  The above information is an  only  It is not intended as medical advice for individual conditions or treatments  Talk to your doctor, nurse or pharmacist before following any medical regimen to see if it is safe and effective for you  © 2017 [address] [employer] Information is for End User's use only and may not be sold, redistributed or otherwise used for commercial purposes  All illustrations and images included in CareNotes® are the copyrighted property of A D [employer] M [employer]  or [employer]oper  Generalized Anxiety Disorder   WHAT YOU NEED TO KNOW:   General anxiety disorder (ANASTACIO) is a condition that causes you to worry more than normal  It also causes you to feel fear in most situations  You are worried or afraid even without a cause  You may worry about your health, job, money, and relationships  It is hard for you to control your worry and feel calm  ANASTACIO prevents you from doing daily activities  It may also prevent you from spending time with family and friends  Without treatment, your anxiety may get worse     DISCHARGE INSTRUCTIONS:   Call 911 for any of the following:   · You have chest pain, tightness, or heaviness that may spread to your shoulders, arms, jaw, neck, or back     · You feel like hurting yourself or someone else  Contact your healthcare provider if:   · Your symptoms get worse or do not get better with treatment  · You have new symptoms since your last visit  · You have questions or concerns about your condition or care  Medicines:   · Medicines  help you feel more calm and relaxed, and decrease your symptoms  · Take your medicine as directed  Contact your healthcare provider if you think your medicine is not helping or if you have side effects  Tell him of her if you are allergic to any medicine  Keep a list of the medicines, vitamins, and herbs you take  Include the amounts, and when and why you take them  Bring the list or the pill bottles to follow-up visits  Carry your medicine list with you in case of an emergency  Manage anxiety:   · Talk to someone about your anxiety  Your healthcare provider may suggest counseling  Cognitive behavioral therapy can help you understand and change how you react to events  It can also help you understand what triggers your symptoms  You might feel more comfortable talking with a friend or family member about your anxiety  Choose someone you know will be supportive and encouraging  · Keep a journal of your symptoms  Write down what you were doing before your symptoms started  Also write down what made the anxiety better or worse  Bring this journal with you to your follow-up appointments  · Find ways to relax  Activities such as yoga, meditation, or listening to music can help you relax  Spend time with friends, or do things you enjoy  · Practice deep breathing  Deep breathing can help you relax when you feel anxious  Focus on taking slow, deep breaths several times a day, or during an anxiety attack  Slowly breathe in through your nose  Pause, then slowly breathe out through your mouth  Try to breathe out longer than you breathed in  · Create a regular sleep routine    Regular sleep can help you feel calmer during the day  Go to sleep and wake up at the same times every day  Do not watch television or use the computer right before bed  Your room should be comfortable, dark, and quiet  · Eat a variety of healthy foods  Healthy foods include fruits, vegetables, low-fat dairy products, lean meats, fish, whole-grain breads, and cooked beans  Healthy foods can help you feel less anxious and have more energy  · Exercise regularly  Exercise can increase your energy level  Exercise may also lift your mood and help you sleep better  Your healthcare provider can help you create an exercise plan  · Do not smoke  Nicotine and other chemicals in cigarettes and cigars can increase anxiety  Ask your healthcare provider for information if you currently smoke and need help to quit  E-cigarettes or smokeless tobacco still contain nicotine  Talk to your healthcare provider before you use these products  · Do not have caffeine  Caffeine can make your symptoms worse  Do not have foods or drinks that are meant to increase your energy level  · Limit or do not drink alcohol  Ask your healthcare provider if alcohol is safe for you  Also ask how much is safe  You may not be able to drink alcohol if you take certain anxiety or depression medicines  · Do not use drugs  Drugs can make your anxiety worse  It can also make anxiety hard to manage  Talk to your healthcare provider if you use drugs and want help to quit  Follow up with your healthcare provider as directed:  Write down your questions so you remember to ask them during your visits  © 2017 [address] [employer] Information is for End User's use only and may not be sold, redistributed or otherwise used for commercial purposes  All illustrations and images included in CareNotes® are the copyrighted property of A D A M , Inc  or Finn Hooper  The above information is an  only   It is not intended as medical advice for individual conditions or treatments  Talk to your doctor, nurse or pharmacist before following any medical regimen to see if it is safe and effective for you  Depression   AMBULATORY CARE:   Depression  is a medical condition that causes feelings of sadness or hopelessness that do not go away  Depression may cause you to lose interest in things you used to enjoy  These feelings may interfere with your daily life  Common symptoms include the following:   · Appetite changes, or weight gain or loss    · Trouble going to sleep or staying asleep, or sleeping too much    · Fatigue or lack of energy    · Feeling restless, irritable, or withdrawn    · Feeling worthless, hopeless, discouraged, or guilty    · Trouble concentrating, remembering things, doing daily tasks, or making decisions    · Thoughts about hurting or killing yourself  Call 911 for any of the following:   · You think about harming yourself or someone else  Contact your healthcare provider if:   · Your symptoms do not improve  · You cannot make it to your next appointment  · You have new symptoms  · You have questions or concerns about your condition or care  Treatment for depression  may include medicine to improve or balance your mood  Therapy may also be used to treat your depression  A therapist will help you learn to cope with your thoughts and feelings  Therapy can be done alone or in a group  It may also be done with family members or a significant other  Self-care:   · Get regular physical activity  Try to exercise for 30 minutes, 3 to 5 days a week  Work with your healthcare provider to develop an exercise plan that you enjoy  Physical activity may improve your symptoms  · Get enough sleep  Create a routine to help you relax before bed  You can listen to music, read, or do yoga  Try to go to bed and wake up at the same time every day  Sleep is important for emotional health       · Eat a variety of healthy foods from all of the food groups  A healthy meal plan is low in fat, salt, and added sugar  Ask your healthcare provider for more information about a meal plan that is right for you  · Do not drink alcohol or use drugs  Alcohol and drugs can make your symptoms worse  Follow up with your healthcare provider as directed: Your healthcare provider will monitor your progress at follow-up visits  He or she will also monitor your medicine if you take antidepressants  Your healthcare provider will ask if the medicine is helping  Tell him or her about any side effects or problems you may have with your medicine  The type or amount of medicine may need to be changed  Write down your questions so you remember to ask them during your visits  © 2017 [address] [facility] Information is for End User's use only and may not be sold, redistributed or otherwise used for commercial purposes  All illustrations and images included in CareNotes® are the copyrighted property of [employer]  or Finn Hooper  The above information is an  only  It is not intended as medical advice for individual conditions or treatments  Talk to your doctor, nurse or pharmacist before following any medical regimen to see if it is safe and effective for you

## 2018-08-01 ENCOUNTER — OFFICE VISIT (OUTPATIENT)
Dept: BEHAVIORAL/MENTAL HEALTH CLINIC | Facility: CLINIC | Age: 58
End: 2018-08-01
Payer: MEDICARE

## 2018-08-01 DIAGNOSIS — G89.4 CHRONIC PAIN SYNDROME: ICD-10-CM

## 2018-08-01 DIAGNOSIS — F33.41 MAJOR DEPRESSIVE DISORDER, RECURRENT, IN PARTIAL REMISSION (HCC): Primary | ICD-10-CM

## 2018-08-01 DIAGNOSIS — F41.1 GENERALIZED ANXIETY DISORDER: ICD-10-CM

## 2018-08-01 PROCEDURE — 90853 GROUP PSYCHOTHERAPY: CPT | Performed by: REGISTERED NURSE

## 2018-08-02 NOTE — PATIENT INSTRUCTIONS
Anxiety   AMBULATORY CARE:   Anxiety  is a condition that causes you to feel extremely worried or nervous  The feelings are so strong that they can cause problems with your daily activities or sleep  Anxiety may be triggered by something you fear, or it may happen without a cause  Family or work stress, smoking, caffeine, and alcohol can increase your risk for anxiety  Certain medicines or health conditions can also increase your risk  Anxiety can become a long-term condition if it is not managed or treated  Common signs and symptoms that may occur with anxiety:   · Fatigue or muscle tightness     · Shaking, restlessness, or irritability     · Problems focusing     · Trouble sleeping     · Feeling jumpy, easily startled, or dizzy     · Rapid heartbeat or shortness of breath  Call 911 if:   · You have chest pain, tightness, or heaviness that may spread to your shoulders, arms, jaw, neck, or back  · You feel like hurting yourself or someone else  Contact your healthcare provider if:   · Your symptoms get worse or do not get better with treatment  · You think your medicine may be causing side effects  · Your anxiety keeps you from doing your regular daily activities  · You have new symptoms since your last visit  · You have questions or concerns about your condition or care  Treatment for anxiety  may include medicines to help you feel calm and relaxed, and decrease your symptoms  Medicines are usually given together with therapy or other treatments  Manage anxiety:   · Talk to someone about your anxiety  Your healthcare provider may suggest counseling  Cognitive behavioral therapy can help you understand and change how you react to events that trigger your symptoms  You might feel more comfortable talking with a friend or family member about your anxiety  Choose someone you know will be supportive and encouraging  · Find ways to relax    Activities such as exercise, meditation, or listening to music can help you relax  Spend time with friends, or do things you enjoy  · Practice deep breathing  Deep breathing can help you relax when you feel anxious  Focus on taking slow, deep breaths several times a day, or during an anxiety attack  Breathe in through your nose and out through your mouth  · Create a regular sleep routine  Regular sleep can help you feel calmer during the day  Go to sleep and wake up at the same times every day  Do not watch television or use the computer right before bed  Your room should be comfortable, dark, and quiet  · Eat a variety of healthy foods  Healthy foods include fruits, vegetables, low-fat dairy products, lean meats, fish, whole-grain breads, and cooked beans  Healthy foods can help you feel less anxious and have more energy  · Exercise regularly  Exercise can increase your energy level  Exercise may also lift your mood and help you sleep better  Your healthcare provider can help you create an exercise plan  · Do not smoke  Nicotine and other chemicals in cigarettes and cigars can increase anxiety  Ask your healthcare provider for information if you currently smoke and need help to quit  E-cigarettes or smokeless tobacco still contain nicotine  Talk to your healthcare provider before you use these products  · Do not have caffeine  Caffeine can make your symptoms worse  Do not have foods or drinks that are meant to increase your energy level  · Limit or do not drink alcohol  Ask your healthcare provider if alcohol is safe for you  You may not be able to drink alcohol if you take certain anxiety or depression medicines  Limit alcohol to 1 drink per day if you are a woman  Limit alcohol to 2 drinks per day if you are a man  A drink of alcohol is 12 ounces of beer, 5 ounces of wine, or 1½ ounces of liquor  · Do not use drugs  Drugs can make your anxiety worse  It can also make anxiety hard to manage   Talk to your healthcare provider if you use drugs and want help to quit  Follow up with your healthcare provider as directed:  Write down your questions so you remember to ask them during your visits  © 2017 [address] [employer] Information is for End User's use only and may not be sold, redistributed or otherwise used for commercial purposes  All illustrations and images included in CareNotes® are the copyrighted property of A [employer] M [employer]  or [employer]oper  The above information is an  only  It is not intended as medical advice for individual conditions or treatments  Talk to your doctor, nurse or pharmacist before following any medical regimen to see if it is safe and effective for you  Depression   AMBULATORY CARE:   Depression  is a medical condition that causes feelings of sadness or hopelessness that do not go away  Depression may cause you to lose interest in things you used to enjoy  These feelings may interfere with your daily life  Common symptoms include the following:   · Appetite changes, or weight gain or loss    · Trouble going to sleep or staying asleep, or sleeping too much    · Fatigue or lack of energy    · Feeling restless, irritable, or withdrawn    · Feeling worthless, hopeless, discouraged, or guilty    · Trouble concentrating, remembering things, doing daily tasks, or making decisions    · Thoughts about hurting or killing yourself  Call 911 for any of the following:   · You think about harming yourself or someone else  Contact your healthcare provider if:   · Your symptoms do not improve  · You cannot make it to your next appointment  · You have new symptoms  · You have questions or concerns about your condition or care  Treatment for depression  may include medicine to improve or balance your mood  Therapy may also be used to treat your depression  A therapist will help you learn to cope with your thoughts and feelings  Therapy can be done alone or in a group   It may also be done with family members or a significant other  Self-care:   · Get regular physical activity  Try to exercise for 30 minutes, 3 to 5 days a week  Work with your healthcare provider to develop an exercise plan that you enjoy  Physical activity may improve your symptoms  · Get enough sleep  Create a routine to help you relax before bed  You can listen to music, read, or do yoga  Try to go to bed and wake up at the same time every day  Sleep is important for emotional health  · Eat a variety of healthy foods from all of the food groups  A healthy meal plan is low in fat, salt, and added sugar  Ask your healthcare provider for more information about a meal plan that is right for you  · Do not drink alcohol or use drugs  Alcohol and drugs can make your symptoms worse  Follow up with your healthcare provider as directed: Your healthcare provider will monitor your progress at follow-up visits  He or she will also monitor your medicine if you take antidepressants  Your healthcare provider will ask if the medicine is helping  Tell him or her about any side effects or problems you may have with your medicine  The type or amount of medicine may need to be changed  Write down your questions so you remember to ask them during your visits  © 2017 [address] [facility] Information is for End User's use only and may not be sold, redistributed or otherwise used for commercial purposes  All illustrations and images included in CareNotes® are the copyrighted property of [employer]  or Finn Hooper  The above information is an  only  It is not intended as medical advice for individual conditions or treatments  Talk to your doctor, nurse or pharmacist before following any medical regimen to see if it is safe and effective for you  Chronic Pain   AMBULATORY CARE:   Chronic pain  is pain that does not get better for 3 months or longer   Chronic pain may hurt all the time, or come and go    Call 911 or have someone call 911 for any of the following:   · You are breathing slower than normal, or you have trouble breathing  · You cannot be awakened  · You have a seizure  Seek care immediately if:   · Your heart is beating slower than normal     · Your heart feels like it is jumping or fluttering  · You cannot think clearly  Contact your healthcare provider if:   · You have side effects from prescription pain medicine, such as itching, nausea, or vomiting  · You have trouble sleeping  · Your pain gets worse, even after you take medicine  · You don't think the medicine is working  · You have questions or concerns about your condition or care  Treatment for chronic pain  may need any of the following:  · Acetaminophen  decreases pain  It is available without a doctor's order  Ask how much to take and how often to take it  Follow directions  Read the labels of all other medicines you are using to see if they also contain acetaminophen, or ask your doctor or pharmacist  Acetaminophen can cause liver damage if not taken correctly  Do not use more than 4 grams (4,000 milligrams) total of acetaminophen in one day  · NSAIDs , such as ibuprofen, help decrease swelling, pain, and fever  This medicine is available with or without a doctor's order  NSAIDs can cause stomach bleeding or kidney problems in certain people  If you take blood thinner medicine, always ask your healthcare provider if NSAIDs are safe for you  Always read the medicine label and follow directions  · Prescription pain medicine  called narcotics or opioids may be given  Ask your healthcare provider how to take this medicine safely  · Anesthetics  can be rubbed on your skin or injected into a nerve or muscle to numb an area  · Other medicines  may reduce pain, anxiety, muscle tension, or swelling    Manage your chronic pain:   · Apply heat  on the area in pain for 20 to 30 minutes every 2 hours for as many days as directed  Heat helps decrease pain and muscle spasms  · Apply ice  on the part of your body that hurts for 15 to 20 minutes every hour or as directed  Use an ice pack, or put crushed ice in a plastic bag  Cover it with a towel  Ice decreases pain and swelling, and helps prevent tissue damage  · Go to physical therapy as directed  A physical therapist teaches you exercises to help improve movement and strength, and to decrease pain  · Exercise for 30 minutes, 3 times a week  Regular physical activity can help decrease pain and improve your quality of life  Ask your healthcare provider about the best exercise plan for your type of pain  · Get enough sleep  Create a relaxing bedtime routine  Go to sleep and wake up at the same time every day  Avoid caffeine in the afternoon  · Talk with a counselor or therapist   A type of counseling called cognitive behavioral therapy (CBT) can help your chronic pain by changing the way you think about it  CBT can also improve your mood, sleep, and ability to move  What you must know if you take narcotic pain medicine:   · You may need to take a bowel movement softener  The most common side effect of prescription pain medicine is constipation  Bowel movement softeners are available over the counter  · Do not mix prescription pain medicines  This can cause an overdose of medicine, which can become life-threatening  Read labels  Make sure you know the ingredients in all of your medicines  · Do not drink alcohol  when you take prescription pain medicine  It is not safe to mix narcotics or opioids with alcohol or illegal drugs  · Prescription pain medicine may impair your ability to drive or work safely  They may also cause dizziness and increase your risk for falling  · Store prescription pain medicine in a safe location at home  Keep your medicine away from children and other people  Never share your medicine with anyone    Follow up with your healthcare provider as directed: You may be referred to a pain specialist  Write down your questions so you remember to ask them during your visits  © 2017 [address] [employer] Information is for End User's use only and may not be sold, redistributed or otherwise used for commercial purposes  All illustrations and images included in CareNotes® are the copyrighted property of A D A M [employer]  or [employer] Hooper  The above information is an  only  It is not intended as medical advice for individual conditions or treatments  Talk to your doctor, nurse or pharmacist before following any medical regimen to see if it is safe and effective for you

## 2018-08-07 DIAGNOSIS — M79.7 FIBROMYALGIA: ICD-10-CM

## 2018-08-07 RX ORDER — AMITRIPTYLINE HYDROCHLORIDE 10 MG/1
TABLET, FILM COATED ORAL
Qty: 90 TABLET | Refills: 0 | Status: SHIPPED | OUTPATIENT
Start: 2018-08-07 | End: 2018-11-04 | Stop reason: SDUPTHER

## 2018-08-29 ENCOUNTER — OFFICE VISIT (OUTPATIENT)
Dept: BEHAVIORAL/MENTAL HEALTH CLINIC | Facility: CLINIC | Age: 58
End: 2018-08-29
Payer: MEDICARE

## 2018-08-29 DIAGNOSIS — F33.41 MAJOR DEPRESSIVE DISORDER, RECURRENT, IN PARTIAL REMISSION (HCC): Primary | ICD-10-CM

## 2018-08-29 DIAGNOSIS — F41.1 GENERALIZED ANXIETY DISORDER: ICD-10-CM

## 2018-08-29 PROCEDURE — 90834 PSYTX W PT 45 MINUTES: CPT | Performed by: REGISTERED NURSE

## 2018-08-29 NOTE — PSYCH
Psychotherapy Provided:  Individual Psychotherapy for 45 minutes  Length of time in session: 45 minutes, follow up in 1 WEEK  Goals addressed in session:  Goal #1 (See New Treatment Plan, completed today )     Pain:  8,  Legs, lower Spine, feet, Fibromyalgia   D: " This summer sucks!"   " The heat , the pain, my depression pull me down  ' " I do still get very Anxious, but I am trying to deal with it,and trying to Calm myself   '     Pt Yolanda Haddad) has an anxious, constricted affect  Her mood is moderately depressed  Denies any SI/HI/AH/VH  She has episodes of irritability, especially with her chronic Pain,and with the severe heat of this summer---She has stayed indoors in her air -conditioned home, where she lives with her elderly parents (ages 80 and 80 ) Pt processes her worries re: her parents  She processes her thoughts, feelings,and behaviors  She has discussed the future with them---they want to live, and eventually die, in their current  Home  Pt helps them the best she can,but her severe pain prevents her from heavy lifting or over-strenuous ,continuous activity  She will get Home Health Care help, when/ if needed  Active Listening ,Support,and Validation given  We do her Treatment Plan today  Review Positive Coping skills, positive Cognitive-reframing,  and Calm/ Deep-Breathing/ Relaxation techniques  She takes meds as prescribed by Dr Rodney Corbett  She is reminded about the Pain Group next Weds, 1pm--->She will attend if at all possible  A: Major Depressive Disorder, recurrent, moderate,F33 1; Generalized Anxiety Disorder, F41 10; Chronic severe Pain  P: Continue new Treatment Plan, Meds  , and Psychotherapy  PAIN SCALE=8    Current suicide risk : Low  Behavioral Health Treatment Plan [facility]: Diagnosis and Treatment Plan explained to Jitendra Corado relates understanding diagnosis and is agreeable to Treatment Plan  YES

## 2018-08-29 NOTE — PATIENT INSTRUCTIONS
Generalized Anxiety Disorder   WHAT YOU NEED TO KNOW:   General anxiety disorder (ANASTACIO) is a condition that causes you to worry more than normal  It also causes you to feel fear in most situations  You are worried or afraid even without a cause  You may worry about your health, job, money, and relationships  It is hard for you to control your worry and feel calm  ANASTACIO prevents you from doing daily activities  It may also prevent you from spending time with family and friends  Without treatment, your anxiety may get worse  DISCHARGE INSTRUCTIONS:   Call 911 for any of the following:   · You have chest pain, tightness, or heaviness that may spread to your shoulders, arms, jaw, neck, or back  · You feel like hurting yourself or someone else  Contact your healthcare provider if:   · Your symptoms get worse or do not get better with treatment  · You have new symptoms since your last visit  · You have questions or concerns about your condition or care  Medicines:   · Medicines  help you feel more calm and relaxed, and decrease your symptoms  · Take your medicine as directed  Contact your healthcare provider if you think your medicine is not helping or if you have side effects  Tell him of her if you are allergic to any medicine  Keep a list of the medicines, vitamins, and herbs you take  Include the amounts, and when and why you take them  Bring the list or the pill bottles to follow-up visits  Carry your medicine list with you in case of an emergency  Manage anxiety:   · Talk to someone about your anxiety  Your healthcare provider may suggest counseling  Cognitive behavioral therapy can help you understand and change how you react to events  It can also help you understand what triggers your symptoms  You might feel more comfortable talking with a friend or family member about your anxiety  Choose someone you know will be supportive and encouraging  · Keep a journal of your symptoms    Write down what you were doing before your symptoms started  Also write down what made the anxiety better or worse  Bring this journal with you to your follow-up appointments  · Find ways to relax  Activities such as yoga, meditation, or listening to music can help you relax  Spend time with friends, or do things you enjoy  · Practice deep breathing  Deep breathing can help you relax when you feel anxious  Focus on taking slow, deep breaths several times a day, or during an anxiety attack  Slowly breathe in through your nose  Pause, then slowly breathe out through your mouth  Try to breathe out longer than you breathed in  · Create a regular sleep routine  Regular sleep can help you feel calmer during the day  Go to sleep and wake up at the same times every day  Do not watch television or use the computer right before bed  Your room should be comfortable, dark, and quiet  · Eat a variety of healthy foods  Healthy foods include fruits, vegetables, low-fat dairy products, lean meats, fish, whole-grain breads, and cooked beans  Healthy foods can help you feel less anxious and have more energy  · Exercise regularly  Exercise can increase your energy level  Exercise may also lift your mood and help you sleep better  Your healthcare provider can help you create an exercise plan  · Do not smoke  Nicotine and other chemicals in cigarettes and cigars can increase anxiety  Ask your healthcare provider for information if you currently smoke and need help to quit  E-cigarettes or smokeless tobacco still contain nicotine  Talk to your healthcare provider before you use these products  · Do not have caffeine  Caffeine can make your symptoms worse  Do not have foods or drinks that are meant to increase your energy level  · Limit or do not drink alcohol  Ask your healthcare provider if alcohol is safe for you  Also ask how much is safe   You may not be able to drink alcohol if you take certain anxiety or depression medicines  · Do not use drugs  Drugs can make your anxiety worse  It can also make anxiety hard to manage  Talk to your healthcare provider if you use drugs and want help to quit  Follow up with your healthcare provider as directed:  Write down your questions so you remember to ask them during your visits  © 2017 [address] [employer]  Information is for End User's use only and may not be sold, redistributed or otherwise used for commercial purposes  All illustrations and images included in CareNotes® are the copyrighted property of [employer]  or [employer]  The above information is an  only  It is not intended as medical advice for individual conditions or treatments  Talk to your doctor, nurse or pharmacist before following any medical regimen to see if it is safe and effective for you  Depression   AMBULATORY CARE:   Depression  is a medical condition that causes feelings of sadness or hopelessness that do not go away  Depression may cause you to lose interest in things you used to enjoy  These feelings may interfere with your daily life  Common symptoms include the following:   · Appetite changes, or weight gain or loss    · Trouble going to sleep or staying asleep, or sleeping too much    · Fatigue or lack of energy    · Feeling restless, irritable, or withdrawn    · Feeling worthless, hopeless, discouraged, or guilty    · Trouble concentrating, remembering things, doing daily tasks, or making decisions    · Thoughts about hurting or killing yourself  Call 911 for any of the following:   · You think about harming yourself or someone else  Contact your healthcare provider if:   · Your symptoms do not improve  · You cannot make it to your next appointment  · You have new symptoms  · You have questions or concerns about your condition or care  Treatment for depression  may include medicine to improve or balance your mood   Therapy may also be used to treat your depression  A therapist will help you learn to cope with your thoughts and feelings  Therapy can be done alone or in a group  It may also be done with family members or a significant other  Self-care:   · Get regular physical activity  Try to exercise for 30 minutes, 3 to 5 days a week  Work with your healthcare provider to develop an exercise plan that you enjoy  Physical activity may improve your symptoms  · Get enough sleep  Create a routine to help you relax before bed  You can listen to music, read, or do yoga  Try to go to bed and wake up at the same time every day  Sleep is important for emotional health  · Eat a variety of healthy foods from all of the food groups  A healthy meal plan is low in fat, salt, and added sugar  Ask your healthcare provider for more information about a meal plan that is right for you  · Do not drink alcohol or use drugs  Alcohol and drugs can make your symptoms worse  Follow up with your healthcare provider as directed: Your healthcare provider will monitor your progress at follow-up visits  He or she will also monitor your medicine if you take antidepressants  Your healthcare provider will ask if the medicine is helping  Tell him or her about any side effects or problems you may have with your medicine  The type or amount of medicine may need to be changed  Write down your questions so you remember to ask them during your visits  © 2017 [address] [employer] Information is for End User's use only and may not be sold, redistributed or otherwise used for commercial purposes  All illustrations and images included in CareNotes® are the copyrighted property of [employer]  or [employer]  The above information is an  only  It is not intended as medical advice for individual conditions or treatments   Talk to your doctor, nurse or pharmacist before following any medical regimen to see if it is safe and effective for you

## 2018-08-29 NOTE — PSYCH
Orvel Parent  1960       Date of Initial Treatment Plan: 5/ 16 /18  (This Episode )  Date of Current Treatment Plan: 08/29/18    Treatment Plan Number  2  (For this Episode )     Strengths/Personal Resources for Self Care:  I am trying to take care of my Physical and Mental  Health  I take meds as prescribed by Dr Humera Rankin and his Provider Group  I am there for my elderly parents, (ages 80 and 80), and help with the Cleaning, Cooking,and Grocery shopping  Diagnosis:   Major Depressive Disorder, recurrent, moderate, F33 1                        Generalized Anxiety disorder, F41 10                         Chronic Pain Syndrome            Area of Needs: I  Have high Anxiety, and waves of Depression, and am dealing with Chronic Pain  Long Term Goal 1: I will have less Anxiety, a brighter Mood,and use my Coping skills  Target Date:  12/ 29/ 18  Completion Date:          Short Term Objectives for Goal 1:  I participate in Psychotherapy  I take my meds as prescribed by Dr Humera Rankin and associates  I participate in Pain and Positive Coping Group  In therapy, I identify my Stressors, including my worries about my elderly parents  I process my thoughts, feelings,and behaviors  I do Cognitive-reframing, focusing on some positive thoughts , instead of automatic -negative thoughts  I talk to my parents at home, about their health,and any future needs---If possible, they want to live at home the rest of their lives  If we need the help of [employer] [facility], we will get that in place, through their doctor's prescriptions  I will take care of myself also---I will do one goal per day, and give self-affirmations  I have Social Support, from my siblings and from my friend Walt Carvalho  If anxious, I do Calm / Deep- Breathing, or go to my room a while, or play a CD/ with Relaxation messages  I also have Massage Therapy twice a month  GOAL 1: Modality:   Individual Psychotherapy 2 to 3 times a month  Group: Monthly Pain & Positive Coping Group  Medication management: Monthly, by Dr Grey Rock and associates  Behavioral Health Treatment Plan [facility]: Diagnosis and Treatment Plan explained to Twanon Kade relates understanding diagnosis and is agreeable to Treatment Plan  Yes        Client Comments : Please share your thoughts, feelings, need and/or experiences regarding your treatment plan:       __________________________________________________________________    __________________________________________________________________    __________________________________________________________________    __________________________________________________________________    _______________________________________                Patient signature, Date Time: __________________________________________             Physician cosigner signature, Date, Time: ________________________________

## 2018-09-24 ENCOUNTER — CLINICAL SUPPORT (OUTPATIENT)
Dept: FAMILY MEDICINE CLINIC | Facility: CLINIC | Age: 58
End: 2018-09-24
Payer: MEDICARE

## 2018-09-24 DIAGNOSIS — Z23 NEED FOR INFLUENZA VACCINATION: Primary | ICD-10-CM

## 2018-09-24 PROCEDURE — 90682 RIV4 VACC RECOMBINANT DNA IM: CPT

## 2018-09-24 PROCEDURE — G0008 ADMIN INFLUENZA VIRUS VAC: HCPCS

## 2018-09-30 NOTE — PSYCH
Progress Note  Psychotherapy Provided [facility]: Individual Psychotherapy 50 minutes provided today  Goals addressed in session:   Goal #1  D: " I'm going to stand up today , for the session   my Back Pain, My calves,and my Left Hip, ALL have Pain today "   " I got an injection into my Left Hip, but it's not really helping much"   "My Pain is a 7 today, but I don't take anything strong for it "   " My Pain pulls my whole mood down "   " I'm very anxious"   " My sister, Cynthia,is going to have Back Surgery, I live with her, so I'll be helping her to recover in September or Cctober"   " Then, my 80 yr old parents are moving back [facility] [address] here, from [place] this [place] run I'm supposed to live with them, in a bedroom at their new house or [place] [address], in [facility] I hope I can handle the expectations of everyone"   " I just "freeze', with anxiety,and can't remember my words, when I am very stressed   "   Pt Kamar Greene) has a very anxious, thought-blocking affect today  Her mood is depressed  PHQ9= 18 today, moderately severe depression  She denies SI /HI/AH /VH  She gets bouts of tearfulness and hopelessness,and her physical pain =7 ,makes her more depressed  Pt processes her stressors  Processes her thoughts, feelings,and behaviors  We do Calm/ Deep-breathing/ Guided-Imagery/ relaxation exercises---> pt to do at home  Some Cognitive-refaming is done, also  Pt to focus on her innate strengths, do one goal per day,and give self-affirmation  She will do the best she can, when her sister has back surgery, but not expect perfection from herself  She will try to do the same when her parents move to [place] this fall---she loves her parents, they love her, but she worries about their health, as they are elderly, 80  Active Listening,Support,and validation also given in session  She continues her Meds as prescribed by Psychiatrist and other doctors     A: Major Depressive Disorder, recurrent, moderate, F33 1; Generalized Anxiety Disorder, F41 10, and Chronic pain, family stressors  R/ O Dependent Personality Disorder  P: Continue Treatment Plan, Meds,and Psychotherapy  Pain Scale and Suicide Risk [facility]: Current Pain Assessment: moderate to severe   On a scale of 0 to 10, the patient rates current pain at 7   Current suicide risk is low   Behavioral Health Treatment Plan [facility]: Diagnosis and Treatment Plan explained to patient, patient relates understanding diagnosis and is agreeable to Treatment Plan  Results/Data  PHQ-9 Adult Depression Screening [id number] 02:48PM Irma Steen     Test Name Result Flag Reference   PHQ-9 Adult Depression Score 18     Over the last two weeks, how often have you been bothered by any of the following problems? Little interest or pleasure in doing things: Nearly every day - 3  Feeling down, depressed, or hopeless: More than half the days - 2  Trouble falling or staying asleep, or sleeping too much: Several days - 1  Feeling tired or having little energy: Nearly every day - 3  Poor appetite or over eating: Several days - 1  Feeling bad about yourself - or that you are a failure or have let yourself or your family down: More than half the days - 2  Trouble concentrating on things, such as reading the newspaper or watching television: Nearly every day - 3  Moving or speaking so slowly that other people could have noticed  Or the opposite -  being so fidgety or restless that you have been moving around a lot more than usual: Nearly every day - 3  Thoughts that you would be better off dead, or of hurting yourself in some way: Not at all - 0   PHQ-9 Adult Depression Screening Positive     PHQ-9 Difficulty Level Very difficult     PHQ-9 Severity      Moderately Severe Depression       Assessment    1  Major depressive disorder, recurrent, moderate (296 32) (F33 1)   2  Generalized anxiety disorder (300 02) (F41 1)   3   Chronic pain (338 29) (G89 29)    Signatures   Electronically signed   SECTION      DILATION AND CURETTAGE OF UTERUS      TUBAL LIGATION       CURRENT MEDICATIONS       Previous Medications    No medications on file     ALLERGIES     is allergic to percocet [oxycodone-acetaminophen]. FAMILY HISTORY     indicated that her mother is alive. She indicated that her father is . She indicated that her brother is alive. SOCIAL HISTORY      reports that she has never smoked. She has never used smokeless tobacco. She reports that she does not drink alcohol or use drugs. PHYSICAL EXAM     INITIAL VITALS: /85   Pulse 112   Temp 98.1 °F (36.7 °C) (Oral)   Resp 14   Ht 5' 5\" (1.651 m)   Wt 205 lb (93 kg)   SpO2 100%   BMI 34.11 kg/m²    Physical Exam   Constitutional: She is oriented to person, place, and time. She appears well-developed and well-nourished. No distress. HENT:   Head: Normocephalic and atraumatic. Right Ear: External ear normal.   Left Ear: External ear normal.   Nose: Nose normal.   Mouth/Throat: Oropharynx is clear and moist.   TMS and pharynx clear, clear rhinorrhea   Eyes: Pupils are equal, round, and reactive to light. Conjunctivae and EOM are normal. Right eye exhibits no discharge. Left eye exhibits no discharge. Neck: Normal range of motion. Neck supple. No tracheal deviation present. Cardiovascular: Normal rate, regular rhythm, normal heart sounds and intact distal pulses. Pulmonary/Chest: Effort normal and breath sounds normal. No stridor. No respiratory distress. She has no wheezes. She has no rales. She exhibits no tenderness. Abdominal: Soft. Bowel sounds are normal. There is no tenderness. There is no rebound and no guarding. Musculoskeletal: Normal range of motion. She exhibits no edema or tenderness. Neurological: She is alert and oriented to person, place, and time. No cranial nerve deficit. Coordination normal.   Skin: Skin is warm and dry. No rash noted. She is not diaphoretic. No erythema. by : TASHIA Markham; Aug 24 2017  4:30PM EST                       (Author)    Electronically signed by : TASHIA Markham; Aug 24 2017  4:30PM EST                       (Author)

## 2018-11-04 DIAGNOSIS — M79.7 FIBROMYALGIA: ICD-10-CM

## 2018-11-04 RX ORDER — AMITRIPTYLINE HYDROCHLORIDE 10 MG/1
TABLET, FILM COATED ORAL
Qty: 90 TABLET | Refills: 0 | Status: SHIPPED | OUTPATIENT
Start: 2018-11-04 | End: 2019-02-06 | Stop reason: SDUPTHER

## 2018-11-07 ENCOUNTER — OFFICE VISIT (OUTPATIENT)
Dept: BEHAVIORAL/MENTAL HEALTH CLINIC | Facility: CLINIC | Age: 58
End: 2018-11-07
Payer: MEDICARE

## 2018-11-07 DIAGNOSIS — F41.1 GENERALIZED ANXIETY DISORDER: ICD-10-CM

## 2018-11-07 DIAGNOSIS — G89.4 CHRONIC PAIN SYNDROME: ICD-10-CM

## 2018-11-07 DIAGNOSIS — F33.41 MAJOR DEPRESSIVE DISORDER, RECURRENT, IN PARTIAL REMISSION (HCC): Primary | ICD-10-CM

## 2018-11-07 PROCEDURE — 90853 GROUP PSYCHOTHERAPY: CPT | Performed by: REGISTERED NURSE

## 2018-11-08 NOTE — PATIENT INSTRUCTIONS
Chronic Pain   WHAT YOU NEED TO KNOW:   What is chronic pain? Chronic pain is pain that does not get better for 3 months or longer  Chronic pain may hurt all the time, or come and go  How is chronic pain diagnosed? Your healthcare provider will ask where your pain is, what it feels like, and when it started  He will ask how your pain is affecting your daily life, your mood, and your sleep  Tell your healthcare provider if anything helps your pain or makes it worse  You may need any of the following:  · Pain scales  use numbers or faces to help you describe how bad your pain is  Your healthcare provider may ask you to rate your pain on a scale of 0 to 10  · An x-ray, ultrasound, CT, or MRI  may show the cause of your chronic pain  You may be given contrast liquid to help the area show up better in the pictures  Tell the healthcare provider if you have ever had an allergic reaction to contrast liquid  Do not enter the MRI room with anything metal  Metal can cause serious injury  Tell the healthcare provider if you have any metal in or on your body  · Stimulation tests  may help to find which nerves or muscles are affected by pain  These tests may include nerve conduction or muscle function studies  How is chronic pain treated? · Medicines:      ¨ Acetaminophen  decreases pain  It is available without a doctor's order  Ask how much to take and how often to take it  Follow directions  Read the labels of all other medicines you are using to see if they also contain acetaminophen, or ask your doctor or pharmacist  Acetaminophen can cause liver damage if not taken correctly  Do not use more than 4 grams (4,000 milligrams) total of acetaminophen in one day  ¨ NSAIDs , such as ibuprofen, help decrease swelling, pain, and fever  This medicine is available with or without a doctor's order  NSAIDs can cause stomach bleeding or kidney problems in certain people   If you take blood thinner medicine, always ask your healthcare provider if NSAIDs are safe for you  Always read the medicine label and follow directions  ¨ Prescription pain medicine  called narcotics or opioids may be given  Ask your healthcare provider how to take this medicine safely  ¨ Anesthetics  can be rubbed on your skin or injected into a nerve or muscle to numb an area  ¨ Other medicines  may reduce pain, anxiety, muscle tension, or swelling  · Transcutaneous electrical nerve stimulation  (TENS) gives you mild, safe electrical signals through a small device attached to your skin  · Surgery  may be done to implant a device that releases pain medicine into your body  Other devices stimulate your nerves with safe electrical signals  What else can I do to manage chronic pain? · Apply heat  on the area in pain for 20 to 30 minutes every 2 hours for as many days as directed  Heat helps decrease pain and muscle spasms  · Apply ice  on the part of your body that hurts for 15 to 20 minutes every hour or as directed  Use an ice pack, or put crushed ice in a plastic bag  Cover it with a towel  Ice decreases pain and swelling, and helps prevent tissue damage  · Go to physical therapy as directed  A physical therapist teaches you exercises to help improve movement and strength, and to decrease pain  · Exercise for 30 minutes, 3 times a week  Regular physical activity can help decrease pain and improve your quality of life  Ask your healthcare provider about the best exercise plan for your type of pain  · Get enough sleep  Create a relaxing bedtime routine  Go to sleep and wake up at the same time every day  Avoid caffeine in the afternoon  · Talk with a counselor or therapist   A type of counseling called cognitive behavioral therapy (CBT) can help your chronic pain by changing the way you think about it  CBT can also improve your mood, sleep, and ability to move    What do I need to know if I take prescription pain medicine? · You may need to take a bowel movement softener  The most common side effect of prescription pain medicine is constipation  Bowel movement softeners are available over the counter  · Do not mix prescription pain medicines  This can cause an overdose of medicine, which can become life-threatening  Read labels  Make sure you know the ingredients in all of your medicines  · Do not drink alcohol  when you take prescription pain medicine  It is not safe to mix narcotics or opioids with alcohol or illegal drugs  · Prescription pain medicine may impair your ability to drive or work safely  They may also cause dizziness and increase your risk for falling  · Store prescription pain medicine in a safe location at home  Keep your medicine away from children and other people  Never share your medicine with anyone  Call 911 or have someone call 911 for any of the following:   · You are breathing slower than normal, or you have trouble breathing  · You cannot be awakened  · You have a seizure  When should I seek immediate care? · Your heart is beating slower than normal     · Your heart feels like it is jumping or fluttering  · You cannot think clearly  When should I contact my healthcare provider? · You have side effects from prescription pain medicine, such as itching, nausea, or vomiting  · You have trouble sleeping  · Your pain gets worse, even after you take medicine  · You don't think the medicine is working  · You have questions or concerns about your condition or care  CARE AGREEMENT:   You have the right to help plan your care  Learn about your health condition and how it may be treated  Discuss treatment options with your caregivers to decide what care you want to receive  You always have the right to refuse treatment  The above information is an  only  It is not intended as medical advice for individual conditions or treatments   Talk to your doctor, nurse or pharmacist before following any medical regimen to see if it is safe and effective for you  © 2017 [address] [employer] Information is for End User's use only and may not be sold, redistributed or otherwise used for commercial purposes  All illustrations and images included in CareNotes® are the copyrighted property of A D A M , Inc  or [employer]oper  Generalized Anxiety Disorder   WHAT YOU NEED TO KNOW:   General anxiety disorder (ANASTACIO) is a condition that causes you to worry more than normal  It also causes you to feel fear in most situations  You are worried or afraid even without a cause  You may worry about your health, job, money, and relationships  It is hard for you to control your worry and feel calm  ANASTACIO prevents you from doing daily activities  It may also prevent you from spending time with family and friends  Without treatment, your anxiety may get worse  DISCHARGE INSTRUCTIONS:   Call 911 for any of the following:   · You have chest pain, tightness, or heaviness that may spread to your shoulders, arms, jaw, neck, or back  · You feel like hurting yourself or someone else  Contact your healthcare provider if:   · Your symptoms get worse or do not get better with treatment  · You have new symptoms since your last visit  · You have questions or concerns about your condition or care  Medicines:   · Medicines  help you feel more calm and relaxed, and decrease your symptoms  · Take your medicine as directed  Contact your healthcare provider if you think your medicine is not helping or if you have side effects  Tell him of her if you are allergic to any medicine  Keep a list of the medicines, vitamins, and herbs you take  Include the amounts, and when and why you take them  Bring the list or the pill bottles to follow-up visits  Carry your medicine list with you in case of an emergency  Manage anxiety:   · Talk to someone about your anxiety    Your healthcare provider may suggest counseling  Cognitive behavioral therapy can help you understand and change how you react to events  It can also help you understand what triggers your symptoms  You might feel more comfortable talking with a friend or family member about your anxiety  Choose someone you know will be supportive and encouraging  · Keep a journal of your symptoms  Write down what you were doing before your symptoms started  Also write down what made the anxiety better or worse  Bring this journal with you to your follow-up appointments  · Find ways to relax  Activities such as yoga, meditation, or listening to music can help you relax  Spend time with friends, or do things you enjoy  · Practice deep breathing  Deep breathing can help you relax when you feel anxious  Focus on taking slow, deep breaths several times a day, or during an anxiety attack  Slowly breathe in through your nose  Pause, then slowly breathe out through your mouth  Try to breathe out longer than you breathed in  · Create a regular sleep routine  Regular sleep can help you feel calmer during the day  Go to sleep and wake up at the same times every day  Do not watch television or use the computer right before bed  Your room should be comfortable, dark, and quiet  · Eat a variety of healthy foods  Healthy foods include fruits, vegetables, low-fat dairy products, lean meats, fish, whole-grain breads, and cooked beans  Healthy foods can help you feel less anxious and have more energy  · Exercise regularly  Exercise can increase your energy level  Exercise may also lift your mood and help you sleep better  Your healthcare provider can help you create an exercise plan  · Do not smoke  Nicotine and other chemicals in cigarettes and cigars can increase anxiety  Ask your healthcare provider for information if you currently smoke and need help to quit  E-cigarettes or smokeless tobacco still contain nicotine   Talk to your healthcare provider before you use these products  · Do not have caffeine  Caffeine can make your symptoms worse  Do not have foods or drinks that are meant to increase your energy level  · Limit or do not drink alcohol  Ask your healthcare provider if alcohol is safe for you  Also ask how much is safe  You may not be able to drink alcohol if you take certain anxiety or depression medicines  · Do not use drugs  Drugs can make your anxiety worse  It can also make anxiety hard to manage  Talk to your healthcare provider if you use drugs and want help to quit  Follow up with your healthcare provider as directed:  Write down your questions so you remember to ask them during your visits  © 2017 [address] Information is for End User's use only and may not be sold, redistributed or otherwise used for commercial purposes  All illustrations and images included in CareNotes® are the copyrighted property of A [employer] M , [employer]  or [employer] Hooper  The above information is an  only  It is not intended as medical advice for individual conditions or treatments  Talk to your doctor, nurse or pharmacist before following any medical regimen to see if it is safe and effective for you  Depression   AMBULATORY CARE:   Depression  is a medical condition that causes feelings of sadness or hopelessness that do not go away  Depression may cause you to lose interest in things you used to enjoy  These feelings may interfere with your daily life    Common symptoms include the following:   · Appetite changes, or weight gain or loss    · Trouble going to sleep or staying asleep, or sleeping too much    · Fatigue or lack of energy    · Feeling restless, irritable, or withdrawn    · Feeling worthless, hopeless, discouraged, or guilty    · Trouble concentrating, remembering things, doing daily tasks, or making decisions    · Thoughts about hurting or killing yourself  Call 911 for any of the following:   · You think about harming yourself or someone else  Contact your healthcare provider if:   · Your symptoms do not improve  · You cannot make it to your next appointment  · You have new symptoms  · You have questions or concerns about your condition or care  Treatment for depression  may include medicine to improve or balance your mood  Therapy may also be used to treat your depression  A therapist will help you learn to cope with your thoughts and feelings  Therapy can be done alone or in a group  It may also be done with family members or a significant other  Self-care:   · Get regular physical activity  Try to exercise for 30 minutes, 3 to 5 days a week  Work with your healthcare provider to develop an exercise plan that you enjoy  Physical activity may improve your symptoms  · Get enough sleep  Create a routine to help you relax before bed  You can listen to music, read, or do yoga  Try to go to bed and wake up at the same time every day  Sleep is important for emotional health  · Eat a variety of healthy foods from all of the food groups  A healthy meal plan is low in fat, salt, and added sugar  Ask your healthcare provider for more information about a meal plan that is right for you  · Do not drink alcohol or use drugs  Alcohol and drugs can make your symptoms worse  Follow up with your healthcare provider as directed: Your healthcare provider will monitor your progress at follow-up visits  He or she will also monitor your medicine if you take antidepressants  Your healthcare provider will ask if the medicine is helping  Tell him or her about any side effects or problems you may have with your medicine  The type or amount of medicine may need to be changed  Write down your questions so you remember to ask them during your visits    © 2017 [address] Information is for End User's use only and may not be sold, redistributed or otherwise used for commercial purposes  All illustrations and images included in CareNotes® are the copyrighted property of [employer]  or [employer] Hooper  The above information is an  only  It is not intended as medical advice for individual conditions or treatments  Talk to your doctor, nurse or pharmacist before following any medical regimen to see if it is safe and effective for you

## 2018-11-08 NOTE — PSYCH
Pain and Positive Coping Group    D: Pt Stevie Aguirre) participated in today's " Pain and Positive Coping" Group  Topics explored today included: Introductions; Confidentiality; Each group Member's sharing of what their current Level of Pain is,and the etiology of it; Some current Stressors which they are facing, including upcoming Holidays; group members' sharing of some residual Grief&Loss they experience , due to cues of the persons they have lost , around previous Thanksgiving or Christmas times; [place] Support shared among Peers; Positive Coping strategies they are using;  and the final segment of today's Group was comprised of Calm Deep-Breathing/ Relaxation/  Guided-Imagery/ Mindfulness, accompanied by [employer] via CD  Nasra Reddy participated actively  Her Pain= 5 today, from Al  [facility] it affects her whole body and her shoulders, today  She had no evidence of any SI/HI/AH/Vh during Group  She gave Peer Support during [place] she received Support, also  She shared that she had lost her dear sister "Arian Corona" at Christmas time, years ago,and she still misses her ,especially at [place], when the family is together  She received Grief Support and Validation , from a peer who had lost his wife  4 years ago, at Thanksgiving time  Pt stated that her current positive Coping strategies are: Daily Prayer; Doing errands and being helpful for her elderly parents, who are in their 80's,and with whom she lives; and Hydrotherapy---she swims [facility] a [facility],  once to twice a week  She also is doing Deep-Breathing/ relaxation exercises, prn,and takes her meds as prescribed  She does positive Cognitive-reframing,if she finds herself getting too negative about things---> she is thankful for her blessings every day  A: Major Depressive Disorder, recurrent, partial remission, F33 41; Generalized Anxiety Disorder, F41 10;  Panic disorder,in remission; and Chronic Pain, G89 29   P: Continue Treatment Plan, Meds, Individual Psychotherapy, and Group Therapy,and Positive Coping strategies

## 2018-11-28 ENCOUNTER — OFFICE VISIT (OUTPATIENT)
Dept: BEHAVIORAL/MENTAL HEALTH CLINIC | Facility: CLINIC | Age: 58
End: 2018-11-28
Payer: MEDICARE

## 2018-11-28 DIAGNOSIS — F33.41 MAJOR DEPRESSIVE DISORDER, RECURRENT, IN PARTIAL REMISSION (HCC): Primary | ICD-10-CM

## 2018-11-28 DIAGNOSIS — F41.1 GENERALIZED ANXIETY DISORDER: ICD-10-CM

## 2018-11-28 DIAGNOSIS — G89.4 CHRONIC PAIN SYNDROME: ICD-10-CM

## 2018-11-28 PROCEDURE — 90834 PSYTX W PT 45 MINUTES: CPT | Performed by: REGISTERED NURSE

## 2018-11-28 NOTE — PSYCH
Psychotherapy Provided:  Individual Psychotherapy for 45 minutes  Length of time in session: 45 minutes, follow up in 2 WEEKS    Goals addressed in session:  Goal #1 (See New Treatment Plan, completed today )      Pain:  6, Arms, Feet,and Fibromyalgia, 'Whole body'  D:" My car is 25years old, but I have no energy or motivation to look at 'new ' used cars, right now   " I have to get  A newer one, sometime soon   '"      Pt Janel Li) has an anxious, moderately depressed affect and mood  Denies any SI/HI/AH/VH  PHQ9= 9, moderate depression  She has chronic Pain, every day  She processes her thoughts, feelings,and behaviors  She is assisted with Cognitive-reframing,and calm deep-breathing  She will start looking at newer used cars, in the new year, BEFORE her 25 yr old car dies  We complete her New Treatment Plan today  She continues meds as prescribed by Dr Shirley Calderon  A: Major Depressive disorder ,recurrent, moderate, F33 1; Generalized anxiety Disorder, F41 10; chronic pain  P: Continue Treatment Plan, Meds,and Psychotherapy, and Pain Group therapy  PAIN SCALE=6    Current suicide risk :  Low  Behavioral Health Treatment Plan [facility]: Diagnosis and Treatment Plan explained to Yamile Szymanski relates understanding diagnosis and is agreeable to Treatment Plan  YES

## 2018-11-28 NOTE — PSYCH
[place] Hola  1960       Date of Initial Treatment Plan:  5/16/18 (For this Episode )   Date of Current Treatment Plan: 11/28/18    Treatment Plan Number  3    Strengths/Personal Resources for Self Care:  I do take care of my Physical and Mental health  I take my meds as prescribed by my doctors  I help my elderly Parents, (Dad is 80,and Mom is 80), and they help me ,too  I have supportive family members  Diagnosis:    Major Depressive Disorder, recurrent, moderate, F33 1                         Generalized Anxiety Disorder, F41 10                          Chronic pain, G89 29      Area of Needs:  I have extreme fatigue, and have low energy and low motivation--->My car is 25years old, it is going to go "gerplunk" sometime soon, but I have no energy to go look for a new car  Long Term Goal 1:  I will have increased energy and motivation, move my muscles some more, and     hopefully  look at new used cars, in the next few months---> I will have a newer used car before next August     Target Date: 3/28/19   Completion Date:          Short Term Objectives for Goal 1:  I participate in Psychotherapy  I take meds as prescribed by my doctors  In Therapy, I identify my stressors  I process my thoughts, feelings,and behaviors  I will do Positive Cognitive-reframing, focusing on realistic thoughts, and doing one goal per day  I aim to join the [employer] program, continue my weekly Pool/ Hydrotherapy ,and perhaps do some increased physical exercise with weights   I will also motivate myself to go to a Car Dealership, in the new year, and look at  'new' used cars, and probably will buy a vehicle, with my parents, which will easily transport my parents and myself  If anxious, I will do my Calm ,Deep-breathing/ Relaxation exercises, I rest as needed, I talk to my Mom or to my massage Therapist, I get a massage twice a month,  and I take my lorazepam if the anxiety or panic-feeling is severe    It also helps me to Hall each day, and I ask Deondre to help me to get through the day, and then I thank Him when I did get through the day  GOAL 1: Modality:  Individual Psychotherapy 1- 2 times a month  Group: Pain Group once a month  Medication Management: Monthly, by Dr Kwame Brantley  Treatment Plan  Luke: Diagnosis and Treatment Plan explained to Radha Shetty relates understanding diagnosis and is agreeable to Treatment Plan  Yes        Client Comments : Please share your thoughts, feelings, need and/or experiences regarding your treatment plan:       __________________________________________________________________    __________________________________________________________________    __________________________________________________________________    __________________________________________________________________    _______________________________________                Patient signature, Date Time: __________________________________________             Physician cosigner signature, Date, Time: ________________________________

## 2019-02-01 ENCOUNTER — TELEPHONE (OUTPATIENT)
Dept: FAMILY MEDICINE CLINIC | Facility: CLINIC | Age: 59
End: 2019-02-01

## 2019-02-01 NOTE — TELEPHONE ENCOUNTER
Called pt to ask if she has had repeat colonoscopy as Dr Alfonso Cantu rec'd after the one she had in 2012  Pt has [address] colon cancer in grandmother  Pt said she had severe abd pain, for which she went to ED, after her last colonoscopy and did not want to repeat  I discussed annual FIT test, but urged her to call Dr Alfonso Cantu because of polypectomy with previous colonoscopy and update him on her previous reaction to get his rec for how to proceed   In the meantime, she will call here for order if she would like to do FIT test and

## 2019-02-06 DIAGNOSIS — M79.7 FIBROMYALGIA: ICD-10-CM

## 2019-02-06 RX ORDER — AMITRIPTYLINE HYDROCHLORIDE 10 MG/1
TABLET, FILM COATED ORAL
Qty: 90 TABLET | Refills: 0 | Status: SHIPPED | OUTPATIENT
Start: 2019-02-06 | End: 2019-05-07 | Stop reason: SDUPTHER

## 2019-03-20 ENCOUNTER — TELEPHONE (OUTPATIENT)
Dept: BEHAVIORAL/MENTAL HEALTH CLINIC | Facility: CLINIC | Age: 59
End: 2019-03-20

## 2019-03-20 ENCOUNTER — DOCUMENTATION (OUTPATIENT)
Dept: BEHAVIORAL/MENTAL HEALTH CLINIC | Facility: CLINIC | Age: 59
End: 2019-03-20

## 2019-03-20 ENCOUNTER — OFFICE VISIT (OUTPATIENT)
Dept: BEHAVIORAL/MENTAL HEALTH CLINIC | Facility: CLINIC | Age: 59
End: 2019-03-20
Payer: MEDICARE

## 2019-03-20 DIAGNOSIS — F33.41 MAJOR DEPRESSIVE DISORDER, RECURRENT, IN PARTIAL REMISSION (HCC): Primary | ICD-10-CM

## 2019-03-20 DIAGNOSIS — F41.1 GENERALIZED ANXIETY DISORDER: ICD-10-CM

## 2019-03-20 PROBLEM — F43.20 GRIEF REACTION: Status: RESOLVED | Noted: 2018-07-18 | Resolved: 2019-03-20

## 2019-03-20 PROBLEM — F43.21 GRIEF REACTION: Status: RESOLVED | Noted: 2018-07-18 | Resolved: 2019-03-20

## 2019-03-20 PROCEDURE — 90834 PSYTX W PT 45 MINUTES: CPT | Performed by: REGISTERED NURSE

## 2019-03-20 NOTE — PSYCH
Psychotherapy Provided:  Individual Psychotherapy for 45 minutes  Length of time in session: 45 minutes  Goals addressed in session: Goal #1     Pain:  5, Body Pain /Fibromyalgia     D: " I am helping my parents at home---My Dad is 80, my Mom is 80  Merrill Simple Merrill Simple I can only do so much, though"   "When my fibromyalgia flares-up, I have to take Relafin for a while  '  [place] Simple " I take a rest, too "    Merrill Simple Merrill Simple "I also have psych  meds and therapy at Dr Neftaly Leslie office"'   " I am taking Citalopram, Amitriptyline, and Lorazepam ,and they are going [id number] [place]  "      " If I am anxious,  I take Walks, or do Deep-Breathing, Relaxation,  I take a rest, or I listen to music,and I do Coloring   "      " I have been supportive of my nephew, and now he is getting ready to go to [place], in [facility]   "  "I am proud of him,and wish him the best in life "      " I know you are Retiring    I hope you have a good prison "       Pt Lilly North) has an anxious, constricted affect, which is a norm for her  Her mood is mildly depressed  Denies any SI/HI/AH/VH  PHQ9=3, minimal depression  She continues to deal with her chronic pain/ fibromyalgia,and has regular treatment and checkups at her doctor  She processes her thoughts, feelings,and behaviors  She realizes she has not come to Therapy since November 2018  Merrill Simple [place] Simple Her Purpose in life ,she indicates today, is to be there for her elderly parents,and to help them each day-->they still do most of their own ADL's and her mother, 80, recently got a new motorized scooter, to make mobility easier  Pt is taking her father to [facility]  for cataracts, soon--->pt enjoys helping him to achieve as much health as possible  Pt processes her thoughts, feelings,and behaviors  She is given Active Listening,Support, and Validation  She does positive Cognitive-reframing, and  Calm,Deep-breathing/ Relaxation exercises, here and at home  We complete Termination of Therapy discussion, as this Therapist is retiring   the patient came here to say good-bye, today  She is given Support to go forward with her positive Coping Skills,  and she will also continue meds and therapy at Dr Thomas Delgado office  Pt is stable for discharge  A:  Major Depressive Disorder, recurrent, partial remission, F33 41; Generalized Anxiety Disorder, F41 10; Pt is stable for Discharge  P: Pt is discharged from Psychotherapy  Continue Positive Coping skills  She will continue Psychiatric Meds and follow-up at Dr David schafer  PAIN SCALE=5    Current suicide risk :  Low  Behavioral Health Treatment Plan [facility]: Diagnosis and Treatment Plan explained to Yamile Szymanski relates understanding diagnosis and is agreeable to Treatment Plan  YES

## 2019-03-20 NOTE — PROGRESS NOTES
THERAPIST DISCHARGE SUMMARY    Pt name: Balta Ramos  Pt : 64  Date of Admission to Outpatient Psychotherapy: 16  Referred by: Dr Meri Ibarra, Neurologist    Provider: Valencia Miller, MS, APRN, PMNS/BC, Psychotherapist    Date Of Discharge: 3/20/19    Presenting Problem: Pt Merrill Billy) presented on 16  with the chief concerns of : Anxiety, Depression,and Chronic Pain  She also reported that she had treatment at Neurology for the Chronic pain/ Fibromyalgia, and wanted to attend a Pain Group, which this Therapist provided  Pt also reported that she had treatment and medication-management by Psychiatrist Dr Taylor Ellis  Pt denied SI/HI/AH/VH  Course of Treatment: Pt participated,intermittently, in Individual Psychotherapy and in the "Pain and Positive Coping Group" (Group Psychotherapy)  She responded fairly well to Therapeutic strategies which included : Cognitive- Behavioral;   Deep-Breathing/ Relaxation/Mindfulness; Active Listening, Support,and Validation of person; and Psychoeducation  She has less depression,and has ongoing anxiety (but not as severe as in the past ) Her PHQ9= 3 today, minimal depression  Her Purpose in life at this time to to be there for, and to assist,  her elderly parents (ages 80 and 80), with whom she lives  She denies any SI/HI/AH/VH  Termination of Therapy discussion has been completed today  Pt is stable for Discharge,and is Discharged today  She will have follow-up at her psychiatrist Dr Taylor Ellis MD      Condition at Discharge: Good/ Stable  Prognosis: Good  Signed by: Rhae Fothergill Tatu, MS,APRN,PMNS/BC

## 2019-03-20 NOTE — PATIENT INSTRUCTIONS
Generalized Anxiety Disorder   AMBULATORY CARE:   Generalized anxiety disorder (ANASTACIO)  is a condition that causes you to worry more than normal  It also causes you to feel fear in most situations  You are worried or afraid even without a cause  You may worry about your health, job, money, and relationships  It is hard for you to control your worry and feel calm  ANASTACIO prevents you from doing daily activities  It may also prevent you from spending time with family and friends  Without treatment, your anxiety may get worse  Common signs and symptoms that may occur with anxiety:   · Fatigue or muscle tightness     · Shaking, restlessness, or irritability     · Problems focusing     · Trouble sleeping     · Feeling jumpy, easily startled, or dizzy     · Nausea, vomiting, diarrhea, or sweating    · Rapid heartbeat or shortness of breath  Call 911 for any of the following:   · You have chest pain, tightness, or heaviness that may spread to your shoulders, arms, jaw, neck, or back  · You feel like hurting yourself or someone else  Contact your healthcare provider if:   · Your symptoms get worse or do not get better with treatment  · Your anxiety keeps you from doing your regular daily activities  · You have new symptoms since your last visit  · You have questions or concerns about your condition or care  Treatment for ANASTACIO  may include medicines to help you feel calm and relaxed, and decrease your symptoms  Medicines are usually given together with therapy or other treatments  Manage anxiety:   · Talk to someone about your anxiety  Your healthcare provider may suggest counseling  Cognitive behavioral therapy can help you understand and change how you react to events  It can also help you understand what triggers your symptoms  You might feel more comfortable talking with a friend or family member about your anxiety  Choose someone you know will be supportive and encouraging      · Keep a journal of your symptoms  Write down what you were doing before your symptoms started  Also write down what made the anxiety better or worse  Bring this journal with you to your follow-up appointments  · Find ways to relax  Activities such as yoga, meditation, or listening to music can help you relax  Spend time with friends, or do things you enjoy  · Practice deep breathing  Deep breathing can help you relax when you feel anxious  Focus on taking slow, deep breaths several times a day, or during an anxiety attack  Slowly breathe in through your nose  Pause, then slowly breathe out through your mouth  Try to breathe out longer than you breathed in  · Create a regular sleep routine  Regular sleep can help you feel calmer during the day  Go to sleep and wake up at the same times every day  Do not watch television or use the computer right before bed  Your room should be comfortable, dark, and quiet  · Eat a variety of healthy foods  Healthy foods include fruits, vegetables, low-fat dairy products, lean meats, fish, whole-grain breads, and cooked beans  Healthy foods can help you feel less anxious and have more energy  · Exercise regularly  Exercise can increase your energy level  Exercise may also lift your mood and help you sleep better  Your healthcare provider can help you create an exercise plan  · Do not smoke  Nicotine and other chemicals in cigarettes and cigars can increase anxiety  Ask your healthcare provider for information if you currently smoke and need help to quit  E-cigarettes or smokeless tobacco still contain nicotine  Talk to your healthcare provider before you use these products  · Do not have caffeine  Caffeine can make your symptoms worse  Do not have foods or drinks that are meant to increase your energy level  · Limit or do not drink alcohol  Ask your healthcare provider if alcohol is safe for you  Also ask how much is safe   You may not be able to drink alcohol if you take certain anxiety or depression medicines  · Do not use drugs  Drugs can make your anxiety worse  It can also make anxiety hard to manage  Talk to your healthcare provider if you use drugs and want help to quit  Follow up with your healthcare provider as directed:  Write down your questions so you remember to ask them during your visits  © 2017 [address] [employer] Information is for End User's use only and may not be sold, redistributed or otherwise used for commercial purposes  All illustrations and images included in CareNotes® are the copyrighted property of [employer] , [employer]  or [employer]oper  The above information is an  only  It is not intended as medical advice for individual conditions or treatments  Talk to your doctor, nurse or pharmacist before following any medical regimen to see if it is safe and effective for you

## 2019-04-09 ENCOUNTER — OFFICE VISIT (OUTPATIENT)
Dept: FAMILY MEDICINE CLINIC | Facility: CLINIC | Age: 59
End: 2019-04-09
Payer: MEDICARE

## 2019-04-09 VITALS
DIASTOLIC BLOOD PRESSURE: 74 MMHG | HEIGHT: 67 IN | WEIGHT: 177 LBS | RESPIRATION RATE: 16 BRPM | BODY MASS INDEX: 27.78 KG/M2 | SYSTOLIC BLOOD PRESSURE: 108 MMHG | HEART RATE: 92 BPM | TEMPERATURE: 98.8 F

## 2019-04-09 DIAGNOSIS — J21.9 ACUTE BRONCHIOLITIS DUE TO UNSPECIFIED ORGANISM: Primary | ICD-10-CM

## 2019-04-09 PROCEDURE — 99213 OFFICE O/P EST LOW 20 MIN: CPT | Performed by: FAMILY MEDICINE

## 2019-04-09 RX ORDER — LACTOBACILLUS RHAMNOSUS GG 10B CELL
1 CAPSULE ORAL DAILY
COMMUNITY
Start: 2016-02-19 | End: 2019-05-30 | Stop reason: SDUPTHER

## 2019-04-09 RX ORDER — INDAPAMIDE 1.25 MG
TABLET ORAL 2 TIMES DAILY
COMMUNITY
Start: 2016-02-19

## 2019-04-09 RX ORDER — GUAIFENESIN 600 MG
TABLET, EXTENDED RELEASE 12 HR ORAL DAILY
COMMUNITY
Start: 2016-02-19 | End: 2019-05-30 | Stop reason: SDUPTHER

## 2019-04-09 RX ORDER — LORAZEPAM 0.5 MG/1
TABLET ORAL
COMMUNITY

## 2019-04-09 RX ORDER — CEFUROXIME AXETIL 250 MG/1
250 TABLET ORAL EVERY 12 HOURS SCHEDULED
Qty: 20 TABLET | Refills: 0 | Status: SHIPPED | OUTPATIENT
Start: 2019-04-09 | End: 2019-04-19

## 2019-04-10 ENCOUNTER — TELEPHONE (OUTPATIENT)
Dept: FAMILY MEDICINE CLINIC | Facility: CLINIC | Age: 59
End: 2019-04-10

## 2019-04-10 DIAGNOSIS — J21.9 ACUTE BRONCHIOLITIS DUE TO UNSPECIFIED ORGANISM: Primary | ICD-10-CM

## 2019-04-10 RX ORDER — BENZONATATE 200 MG/1
200 CAPSULE ORAL 3 TIMES DAILY PRN
Qty: 20 CAPSULE | Refills: 0 | Status: SHIPPED | OUTPATIENT
Start: 2019-04-10 | End: 2019-05-30 | Stop reason: ALTCHOICE

## 2019-05-07 DIAGNOSIS — M79.7 FIBROMYALGIA: ICD-10-CM

## 2019-05-07 RX ORDER — AMITRIPTYLINE HYDROCHLORIDE 25 MG/1
TABLET, FILM COATED ORAL
Qty: 90 TABLET | Refills: 0 | Status: SHIPPED | OUTPATIENT
Start: 2019-05-07 | End: 2019-05-30 | Stop reason: ALTCHOICE

## 2019-05-30 ENCOUNTER — OFFICE VISIT (OUTPATIENT)
Dept: FAMILY MEDICINE CLINIC | Facility: CLINIC | Age: 59
End: 2019-05-30
Payer: MEDICARE

## 2019-05-30 VITALS
OXYGEN SATURATION: 98 % | HEIGHT: 66 IN | BODY MASS INDEX: 28.93 KG/M2 | WEIGHT: 180 LBS | HEART RATE: 94 BPM | SYSTOLIC BLOOD PRESSURE: 104 MMHG | TEMPERATURE: 98.8 F | DIASTOLIC BLOOD PRESSURE: 62 MMHG

## 2019-05-30 DIAGNOSIS — Z00.00 MEDICARE ANNUAL WELLNESS VISIT, SUBSEQUENT: Primary | ICD-10-CM

## 2019-05-30 DIAGNOSIS — Z23 NEED FOR SHINGLES VACCINE: ICD-10-CM

## 2019-05-30 PROCEDURE — G0439 PPPS, SUBSEQ VISIT: HCPCS | Performed by: NURSE PRACTITIONER

## 2019-05-30 RX ORDER — UBIDECARENONE 75 MG
1 CAPSULE ORAL DAILY
COMMUNITY
End: 2021-03-16

## 2019-05-30 RX ORDER — AMITRIPTYLINE HYDROCHLORIDE 10 MG/1
1 TABLET, FILM COATED ORAL
COMMUNITY
End: 2019-11-08 | Stop reason: ALTCHOICE

## 2019-05-30 RX ORDER — CITALOPRAM 20 MG/1
30 TABLET ORAL DAILY
Refills: 1 | COMMUNITY
Start: 2019-04-23

## 2019-07-29 DIAGNOSIS — M79.7 FIBROMYALGIA: ICD-10-CM

## 2019-07-29 RX ORDER — AMITRIPTYLINE HYDROCHLORIDE 25 MG/1
TABLET, FILM COATED ORAL
Qty: 90 TABLET | Refills: 0 | Status: SHIPPED | OUTPATIENT
Start: 2019-07-29 | End: 2019-10-28 | Stop reason: SDUPTHER

## 2019-08-21 ENCOUNTER — OFFICE VISIT (OUTPATIENT)
Dept: FAMILY MEDICINE CLINIC | Facility: CLINIC | Age: 59
End: 2019-08-21
Payer: MEDICARE

## 2019-08-21 VITALS
SYSTOLIC BLOOD PRESSURE: 110 MMHG | HEIGHT: 67 IN | RESPIRATION RATE: 16 BRPM | DIASTOLIC BLOOD PRESSURE: 72 MMHG | TEMPERATURE: 97.8 F | WEIGHT: 181 LBS | HEART RATE: 70 BPM | BODY MASS INDEX: 28.41 KG/M2 | OXYGEN SATURATION: 98 %

## 2019-08-21 DIAGNOSIS — J01.00 ACUTE MAXILLARY SINUSITIS, RECURRENCE NOT SPECIFIED: Primary | ICD-10-CM

## 2019-08-21 PROCEDURE — 99213 OFFICE O/P EST LOW 20 MIN: CPT | Performed by: FAMILY MEDICINE

## 2019-08-21 RX ORDER — CEFUROXIME AXETIL 250 MG/1
250 TABLET ORAL EVERY 12 HOURS SCHEDULED
Qty: 20 TABLET | Refills: 0 | Status: SHIPPED | OUTPATIENT
Start: 2019-08-21 | End: 2019-08-31

## 2019-08-21 NOTE — PROGRESS NOTES
Assessment/Plan:  Patient to return to the office in 1 week or sooner chelsie Baltazar Sole Diagnoses and all orders for this visit:    Acute maxillary sinusitis, recurrence not specified  Comments:  Ceftin 250 mg b i d  For 10 days  Continue Mucinex and saline nasal spray  Increase fluids and rest   Orders:  -     cefuroxime (CEFTIN) 250 mg tablet; Take 1 tablet (250 mg total) by mouth every 12 (twelve) hours for 10 days          Subjective:      Patient ID: Arian Davis is a 64 y o  female  Patient started 4 days ago with cold symptoms  She complains of nasal congestion productive of green mucus, sore throat and nonproductive cough  Patient admits to sinus pressure and pain but denies fever  She has treated this with guaifenesin and saline nasal spray with some relief  URI    This is a new problem  The current episode started in the past 7 days  The problem has been gradually worsening  There has been no fever  Associated symptoms include congestion, coughing, a plugged ear sensation, sinus pain, sneezing and a sore throat  Pertinent negatives include no ear pain, headaches, rhinorrhea or wheezing  She has tried acetaminophen and increased fluids (tussin) for the symptoms  The treatment provided mild relief  The following portions of the patient's history were reviewed and updated as appropriate: allergies, current medications, past family history, past medical history, past social history, past surgical history and problem list     Review of Systems   HENT: Positive for congestion, sinus pain, sneezing and sore throat  Negative for ear pain and rhinorrhea  Respiratory: Positive for cough  Negative for wheezing  Neurological: Negative for headaches           Objective:      /72 (BP Location: Left arm, Patient Position: Sitting, Cuff Size: Adult)   Pulse 70   Temp 97 8 °F (36 6 °C) (Tympanic)   Resp 16   Ht 5' 7" (1 702 m)   Wt 82 1 kg (181 lb)   SpO2 98%   BMI 28 35 kg/m² Physical Exam   Constitutional: She is oriented to person, place, and time  She appears well-developed and well-nourished  No distress  HENT:   Head: Normocephalic  Right Ear: External ear normal    Left Ear: External ear normal    Positive turbinates swelling with mucoid purulent drainage  Throat postnasal drainage and erythema  Mucous membranes moist    Eyes: Conjunctivae are normal  No scleral icterus  Neck: Neck supple  Cardiovascular: Normal rate and regular rhythm  Pulmonary/Chest: Effort normal and breath sounds normal  No respiratory distress  She has no wheezes  Abdominal: Soft  There is no tenderness  Musculoskeletal: She exhibits no edema  Lymphadenopathy:     She has no cervical adenopathy  Neurological: She is alert and oriented to person, place, and time  Skin: Skin is warm and dry  Psychiatric: She has a normal mood and affect

## 2019-10-17 ENCOUNTER — CLINICAL SUPPORT (OUTPATIENT)
Dept: FAMILY MEDICINE CLINIC | Facility: CLINIC | Age: 59
End: 2019-10-17
Payer: MEDICARE

## 2019-10-17 DIAGNOSIS — Z23 NEED FOR INFLUENZA VACCINATION: Primary | ICD-10-CM

## 2019-10-17 PROCEDURE — G0008 ADMIN INFLUENZA VIRUS VAC: HCPCS

## 2019-10-17 PROCEDURE — 90682 RIV4 VACC RECOMBINANT DNA IM: CPT

## 2019-10-28 DIAGNOSIS — M79.7 FIBROMYALGIA: ICD-10-CM

## 2019-10-28 RX ORDER — AMITRIPTYLINE HYDROCHLORIDE 25 MG/1
TABLET, FILM COATED ORAL
Qty: 90 TABLET | Refills: 0 | Status: SHIPPED | OUTPATIENT
Start: 2019-10-28 | End: 2020-01-06 | Stop reason: SDUPTHER

## 2019-11-06 ENCOUNTER — ANESTHESIA EVENT (OUTPATIENT)
Dept: GASTROENTEROLOGY | Facility: HOSPITAL | Age: 59
End: 2019-11-06

## 2019-11-08 ENCOUNTER — ANESTHESIA (OUTPATIENT)
Dept: GASTROENTEROLOGY | Facility: HOSPITAL | Age: 59
End: 2019-11-08

## 2019-11-08 ENCOUNTER — HOSPITAL ENCOUNTER (OUTPATIENT)
Dept: GASTROENTEROLOGY | Facility: HOSPITAL | Age: 59
Setting detail: OUTPATIENT SURGERY
Discharge: HOME | End: 2019-11-08
Attending: INTERNAL MEDICINE | Admitting: INTERNAL MEDICINE
Payer: MEDICARE

## 2019-11-08 VITALS
WEIGHT: 178 LBS | BODY MASS INDEX: 27.94 KG/M2 | HEART RATE: 65 BPM | SYSTOLIC BLOOD PRESSURE: 104 MMHG | OXYGEN SATURATION: 100 % | TEMPERATURE: 97.9 F | RESPIRATION RATE: 17 BRPM | DIASTOLIC BLOOD PRESSURE: 63 MMHG | HEIGHT: 67 IN

## 2019-11-08 DIAGNOSIS — K58.9 IRRITABLE BOWEL SYNDROME WITHOUT DIARRHEA: ICD-10-CM

## 2019-11-08 DIAGNOSIS — Z83.71 FAMILY HISTORY OF COLONIC POLYPS: ICD-10-CM

## 2019-11-08 DIAGNOSIS — Z12.11 ENCOUNTER FOR SCREENING FOR MALIGNANT NEOPLASM OF COLON: ICD-10-CM

## 2019-11-08 RX ORDER — LIDOCAINE HYDROCHLORIDE 10 MG/ML
INJECTION, SOLUTION INFILTRATION; PERINEURAL AS NEEDED
Status: DISCONTINUED | OUTPATIENT
Start: 2019-11-08 | End: 2019-11-08 | Stop reason: SURG

## 2019-11-08 RX ORDER — PROPOFOL 10 MG/ML
INJECTION, EMULSION INTRAVENOUS AS NEEDED
Status: DISCONTINUED | OUTPATIENT
Start: 2019-11-08 | End: 2019-11-08 | Stop reason: SURG

## 2019-11-08 RX ORDER — SODIUM CHLORIDE 9 MG/ML
125 INJECTION, SOLUTION INTRAVENOUS CONTINUOUS
Status: DISCONTINUED | OUTPATIENT
Start: 2019-11-08 | End: 2019-11-12 | Stop reason: HOSPADM

## 2019-11-08 RX ADMIN — PROPOFOL 50 MG: 10 INJECTION, EMULSION INTRAVENOUS at 09:40

## 2019-11-08 RX ADMIN — PROPOFOL 50 MG: 10 INJECTION, EMULSION INTRAVENOUS at 09:42

## 2019-11-08 RX ADMIN — LIDOCAINE HYDROCHLORIDE 50 MG: 10 INJECTION, SOLUTION INFILTRATION; PERINEURAL at 09:38

## 2019-11-08 RX ADMIN — PROPOFOL 50 MG: 10 INJECTION, EMULSION INTRAVENOUS at 09:46

## 2019-11-08 RX ADMIN — SODIUM CHLORIDE 125 ML/HR: 0.9 INJECTION, SOLUTION INTRAVENOUS at 09:30

## 2019-11-08 RX ADMIN — PROPOFOL 100 MG: 10 INJECTION, EMULSION INTRAVENOUS at 09:38

## 2019-11-08 NOTE — DISCHARGE INSTRUCTIONS
Please call [phone] with any problems    Your examination today was normal   I have suggested repeat examination in 5 years

## 2019-11-08 NOTE — ANESTHESIA PREPROCEDURE EVALUATION
Review of Systems/Medical History  Patient summary reviewed    History of anesthetic complications PONV    Cardiovascular  Negative cardio ROS    Pulmonary  Negative pulmonary ROS        GI/Hepatic  Negative GI/hepatic ROS          Negative  ROS        Endo/Other  Negative endo/other ROS      GYN  Negative gynecology ROS          Hematology  Negative hematology ROS      Musculoskeletal  Negative musculoskeletal ROS        Neurology  Negative neurology ROS      Psychology   Negative psychology ROS Anxiety, Depression ,              Physical Exam    Airway    Mallampati score: II  TM Distance: >3 FB  Neck ROM: full     Dental   No notable dental hx     Cardiovascular  Comment: Negative ROS, Rhythm: regular, Rate: normal, Cardiovascular exam normal    Pulmonary  Pulmonary exam normal Breath sounds clear to auscultation,     Other Findings        Anesthesia Plan  ASA Score- 2     Anesthesia Type- general with ASA Monitors  Additional Monitors:   Airway Plan:         Plan Factors-    Induction- intravenous  Postoperative Plan-     Informed Consent- Anesthetic plan and risks discussed with patient

## 2019-11-08 NOTE — INTERVAL H&P NOTE
H&P reviewed  After examining the patient I find no changes in the patients condition since the H&P had been written      Vitals:    11/08/19 0908   BP: 123/61   Pulse: 72   Resp: 16   Temp: 97 9 °F (36 6 °C)   SpO2: 100%

## 2020-01-06 ENCOUNTER — OFFICE VISIT (OUTPATIENT)
Dept: FAMILY MEDICINE CLINIC | Facility: CLINIC | Age: 60
End: 2020-01-06
Payer: MEDICARE

## 2020-01-06 VITALS
DIASTOLIC BLOOD PRESSURE: 60 MMHG | HEIGHT: 67 IN | WEIGHT: 181 LBS | SYSTOLIC BLOOD PRESSURE: 100 MMHG | BODY MASS INDEX: 28.41 KG/M2 | TEMPERATURE: 97.6 F | HEART RATE: 76 BPM | RESPIRATION RATE: 16 BRPM | OXYGEN SATURATION: 97 %

## 2020-01-06 DIAGNOSIS — J01.00 ACUTE MAXILLARY SINUSITIS, RECURRENCE NOT SPECIFIED: Primary | ICD-10-CM

## 2020-01-06 PROCEDURE — 99213 OFFICE O/P EST LOW 20 MIN: CPT | Performed by: FAMILY MEDICINE

## 2020-01-06 RX ORDER — AMITRIPTYLINE HYDROCHLORIDE 10 MG/1
10 TABLET, FILM COATED ORAL
COMMUNITY
Start: 2019-11-18

## 2020-01-06 RX ORDER — FLUTICASONE PROPIONATE 50 MCG
2 SPRAY, SUSPENSION (ML) NASAL DAILY
Qty: 1 BOTTLE | Refills: 3 | Status: SHIPPED | OUTPATIENT
Start: 2020-01-06

## 2020-01-06 RX ORDER — GUAIFENESIN 200 MG/1
TABLET ORAL EVERY 4 HOURS
COMMUNITY
Start: 2012-12-04 | End: 2020-01-06 | Stop reason: SDUPTHER

## 2020-01-06 RX ORDER — CEFUROXIME AXETIL 250 MG/1
250 TABLET ORAL EVERY 12 HOURS SCHEDULED
Qty: 20 TABLET | Refills: 0 | Status: SHIPPED | OUTPATIENT
Start: 2020-01-06 | End: 2020-01-16

## 2020-01-06 NOTE — PROGRESS NOTES
Assessment/Plan:  Patient will be started on Ceftin 250 mg b i d  For 10 days and fluticasone nasal spray 2 sprays per nostril daily  Recommend discontinue Afrin nasal spray and may continue saline nasal spray p r n   Increase fluids and rest   Return the office in 1 week or sooner p r n  [employer] [employer] Diagnoses and all orders for this visit:    Acute maxillary sinusitis, recurrence not specified  -     cefuroxime (CEFTIN) 250 mg tablet; Take 1 tablet (250 mg total) by mouth every 12 (twelve) hours for 10 days  -     fluticasone (FLONASE) 50 mcg/act nasal spray; 2 sprays into each nostril daily    Other orders  -     Discontinue: guaiFENesin 200 MG tablet; Take by mouth every 4 (four) hours  -     amitriptyline (ELAVIL) 10 mg tablet          Subjective:      Patient ID: Miki Hodge is a 64 y o  female  Patient complains of cold symptoms for the past 5 days  She complains of nasal congestion, sore throat, ears feeling blocked and sinus pressure in her face  She denies fever  She has treated this with Afrin and saline nasal sprays  URI    This is a new problem  The current episode started in the past 7 days  The problem has been gradually worsening  There has been no fever  Associated symptoms include congestion, a plugged ear sensation, sinus pain, sneezing and a sore throat  Pertinent negatives include no coughing, ear pain, headaches, rhinorrhea or wheezing  She has tried acetaminophen and increased fluids (afrin, saline nasal spray) for the symptoms  The treatment provided mild relief  The following portions of the patient's history were reviewed and updated as appropriate: allergies, current medications, past family history, past medical history, past social history, past surgical history and problem list     Review of Systems   HENT: Positive for congestion, sinus pain, sneezing and sore throat  Negative for ear pain and rhinorrhea  Respiratory: Negative for cough and wheezing      Neurological: Negative for headaches  Objective:      /60 (BP Location: Left arm, Patient Position: Sitting, Cuff Size: Standard)   Pulse 76   Temp 97 6 °F (36 4 °C) (Tympanic)   Resp 16   Ht 5' 6 75" (1 695 m)   Wt 82 1 kg (181 lb)   SpO2 97%   BMI 28 56 kg/m²          Physical Exam   Constitutional: She is oriented to person, place, and time  She appears well-developed and well-nourished  HENT:   Head: Normocephalic  Right Ear: External ear normal    Left Ear: External ear normal    Turbinates swelling with mucoid drainage  Throat postnasal drainage and injected  Eyes: Conjunctivae are normal  No scleral icterus  Neck: Neck supple  Cardiovascular: Normal rate and regular rhythm  Pulmonary/Chest: Effort normal and breath sounds normal    Abdominal: Soft  There is no tenderness  Musculoskeletal: She exhibits no edema  Lymphadenopathy:     She has no cervical adenopathy  Neurological: She is alert and oriented to person, place, and time  Skin: Skin is warm and dry  Psychiatric: She has a normal mood and affect  BMI Counseling: Body mass index is 28 56 kg/m²  The BMI is above normal  Nutrition recommendations include 3-5 servings of fruits/vegetables daily and consuming healthier snacks  Exercise recommendations include moderate aerobic physical activity for 150 minutes/week

## 2020-02-03 ENCOUNTER — TELEPHONE (OUTPATIENT)
Dept: FAMILY MEDICINE CLINIC | Facility: CLINIC | Age: 60
End: 2020-02-03

## 2020-03-11 ENCOUNTER — OFFICE VISIT (OUTPATIENT)
Dept: FAMILY MEDICINE CLINIC | Facility: CLINIC | Age: 60
End: 2020-03-11
Payer: MEDICARE

## 2020-03-11 VITALS
SYSTOLIC BLOOD PRESSURE: 110 MMHG | DIASTOLIC BLOOD PRESSURE: 60 MMHG | TEMPERATURE: 97.6 F | HEIGHT: 66 IN | RESPIRATION RATE: 18 BRPM | HEART RATE: 78 BPM | BODY MASS INDEX: 28.93 KG/M2 | OXYGEN SATURATION: 97 % | WEIGHT: 180 LBS

## 2020-03-11 DIAGNOSIS — J06.9 UPPER RESPIRATORY TRACT INFECTION, UNSPECIFIED TYPE: Primary | ICD-10-CM

## 2020-03-11 PROCEDURE — 1036F TOBACCO NON-USER: CPT | Performed by: FAMILY MEDICINE

## 2020-03-11 PROCEDURE — 99213 OFFICE O/P EST LOW 20 MIN: CPT | Performed by: FAMILY MEDICINE

## 2020-03-11 PROCEDURE — 3008F BODY MASS INDEX DOCD: CPT | Performed by: FAMILY MEDICINE

## 2020-03-11 NOTE — PROGRESS NOTES
Assessment/Plan:  Probable viral URI  Will treat symptomatically  Patient to continue Flonase nasal spray 2 sprays per nostril daily and may take Claritin 10 mg 1 daily  Recommend increase fluids and rest   Return to the office in 1 week or call sooner chelsie Núñez Diagnoses and all orders for this visit:    Upper respiratory tract infection, unspecified type          Subjective:      Patient ID: Miki Hodge is a 64 y o  female  Patient started 2 days ago with cold symptoms  She complains of nasal congestion and postnasal drainage productive of thick clear to whitish mucus  She admits to sneezing  Patient denies fever, sore throat or cough  She has treated this with Flonase and Tylenol with some relief  URI    This is a new problem  The current episode started in the past 7 days  There has been no fever  Associated symptoms include congestion and sneezing  Pertinent negatives include no coughing, diarrhea, ear pain, headaches, nausea, plugged ear sensation, rhinorrhea, sinus pain, sore throat, vomiting or wheezing  She has tried acetaminophen and increased fluids (flonase) for the symptoms  The treatment provided mild relief  The following portions of the patient's history were reviewed and updated as appropriate: allergies, current medications, past family history, past medical history, past social history, past surgical history and problem list     Review of Systems   HENT: Positive for congestion and sneezing  Negative for ear pain, rhinorrhea, sinus pain and sore throat  Respiratory: Negative for cough and wheezing  Gastrointestinal: Negative for diarrhea, nausea and vomiting  Neurological: Negative for headaches           Objective:      /60 (BP Location: Left arm, Patient Position: Sitting, Cuff Size: Adult)   Pulse 78   Temp 97 6 °F (36 4 °C) (Tympanic)   Resp 18   Ht 5' 6" (1 676 m)   Wt 81 6 kg (180 lb)   SpO2 97% Comment: Room air  BMI 29 05 kg/m² Physical Exam   Constitutional: She is oriented to person, place, and time  She appears well-developed and well-nourished  No distress  HENT:   Head: Normocephalic  Right Ear: External ear normal    Left Ear: External ear normal    Mouth/Throat: No oropharyngeal exudate  Mild turbinates swelling with clear drainage  Throat with postnasal drainage  Mucous membranes moist    Eyes: Conjunctivae are normal  No scleral icterus  Neck: Neck supple  Cardiovascular: Normal rate and regular rhythm  Pulmonary/Chest: Effort normal and breath sounds normal    Abdominal: Soft  There is no tenderness  Musculoskeletal: She exhibits no edema  Lymphadenopathy:     She has no cervical adenopathy  Neurological: She is alert and oriented to person, place, and time  Skin: Skin is warm and dry  Psychiatric: She has a normal mood and affect

## 2020-05-11 ENCOUNTER — TELEMEDICINE (OUTPATIENT)
Dept: FAMILY MEDICINE CLINIC | Facility: CLINIC | Age: 60
End: 2020-05-11
Payer: MEDICARE

## 2020-05-11 ENCOUNTER — TELEPHONE (OUTPATIENT)
Dept: FAMILY MEDICINE CLINIC | Facility: CLINIC | Age: 60
End: 2020-05-11

## 2020-05-11 DIAGNOSIS — R23.8 SCALP IRRITATION: Primary | ICD-10-CM

## 2020-05-11 PROCEDURE — 99213 OFFICE O/P EST LOW 20 MIN: CPT | Performed by: FAMILY MEDICINE

## 2020-05-11 RX ORDER — FLUOCINONIDE TOPICAL SOLUTION USP, 0.05% 0.5 MG/ML
SOLUTION TOPICAL 2 TIMES DAILY
Qty: 60 ML | Refills: 0 | Status: SHIPPED | OUTPATIENT
Start: 2020-05-11

## 2020-05-29 RX ORDER — METHOCARBAMOL 750 MG/1
TABLET, FILM COATED ORAL
COMMUNITY

## 2020-06-01 ENCOUNTER — OFFICE VISIT (OUTPATIENT)
Dept: FAMILY MEDICINE CLINIC | Facility: CLINIC | Age: 60
End: 2020-06-01
Payer: MEDICARE

## 2020-06-01 VITALS
HEIGHT: 66 IN | DIASTOLIC BLOOD PRESSURE: 74 MMHG | RESPIRATION RATE: 16 BRPM | BODY MASS INDEX: 28.77 KG/M2 | HEART RATE: 64 BPM | WEIGHT: 179 LBS | SYSTOLIC BLOOD PRESSURE: 110 MMHG | TEMPERATURE: 97.7 F

## 2020-06-01 DIAGNOSIS — Z00.00 MEDICARE ANNUAL WELLNESS VISIT, SUBSEQUENT: Primary | ICD-10-CM

## 2020-06-01 PROCEDURE — 1036F TOBACCO NON-USER: CPT | Performed by: FAMILY MEDICINE

## 2020-06-01 PROCEDURE — G0439 PPPS, SUBSEQ VISIT: HCPCS | Performed by: FAMILY MEDICINE

## 2020-06-01 PROCEDURE — 3008F BODY MASS INDEX DOCD: CPT | Performed by: FAMILY MEDICINE

## 2020-09-15 ENCOUNTER — CLINICAL SUPPORT (OUTPATIENT)
Dept: FAMILY MEDICINE CLINIC | Facility: CLINIC | Age: 60
End: 2020-09-15
Payer: MEDICARE

## 2020-09-15 VITALS — TEMPERATURE: 97.8 F

## 2020-09-15 DIAGNOSIS — Z23 NEED FOR INFLUENZA VACCINATION: Primary | ICD-10-CM

## 2020-09-15 PROCEDURE — 90682 RIV4 VACC RECOMBINANT DNA IM: CPT

## 2020-09-15 PROCEDURE — G0008 ADMIN INFLUENZA VIRUS VAC: HCPCS

## 2020-11-05 RX ORDER — LANCETS
EACH MISCELLANEOUS
Qty: 100 EACH | Refills: 2 | OUTPATIENT
Start: 2020-11-05

## 2021-01-13 ENCOUNTER — TELEPHONE (OUTPATIENT)
Dept: FAMILY MEDICINE CLINIC | Facility: CLINIC | Age: 61
End: 2021-01-13

## 2021-01-13 NOTE — TELEPHONE ENCOUNTER
Pt called regarding COVID vaccine, asking if it is appropriate for her to received the vaccine based on her medical conditions  She also asked this regarding her parents - tasks sent to you in their charts  Please advise  Thank you

## 2021-02-02 ENCOUNTER — VBI (OUTPATIENT)
Dept: ADMINISTRATIVE | Facility: OTHER | Age: 61
End: 2021-02-02

## 2021-02-18 ENCOUNTER — VBI (OUTPATIENT)
Dept: ADMINISTRATIVE | Facility: OTHER | Age: 61
End: 2021-02-18

## 2021-03-17 ENCOUNTER — OFFICE VISIT (OUTPATIENT)
Dept: FAMILY MEDICINE CLINIC | Facility: CLINIC | Age: 61
End: 2021-03-17
Payer: MEDICARE

## 2021-03-17 ENCOUNTER — TELEPHONE (OUTPATIENT)
Dept: ADMINISTRATIVE | Facility: OTHER | Age: 61
End: 2021-03-17

## 2021-03-17 VITALS
SYSTOLIC BLOOD PRESSURE: 108 MMHG | OXYGEN SATURATION: 97 % | DIASTOLIC BLOOD PRESSURE: 74 MMHG | BODY MASS INDEX: 28.34 KG/M2 | HEART RATE: 74 BPM | TEMPERATURE: 97.5 F | WEIGHT: 175.6 LBS

## 2021-03-17 DIAGNOSIS — E55.9 VITAMIN D DEFICIENCY: ICD-10-CM

## 2021-03-17 DIAGNOSIS — R53.83 FATIGUE, UNSPECIFIED TYPE: Primary | ICD-10-CM

## 2021-03-17 DIAGNOSIS — F33.41 MAJOR DEPRESSIVE DISORDER, RECURRENT, IN PARTIAL REMISSION (HCC): ICD-10-CM

## 2021-03-17 DIAGNOSIS — R71.8 OTHER ABNORMALITY OF RED BLOOD CELLS: ICD-10-CM

## 2021-03-17 PROBLEM — E56.9 VITAMIN DEFICIENCY: Status: ACTIVE | Noted: 2021-03-17

## 2021-03-17 LAB
25(OH)D3 SERPL-MCNC: 41.7 NG/ML (ref 30–100)
ERYTHROCYTE [DISTWIDTH] IN BLOOD BY AUTOMATED COUNT: 14 % (ref 11.6–15.1)
HCT VFR BLD AUTO: 39.8 % (ref 34.8–46.1)
HGB BLD-MCNC: 12.9 G/DL (ref 11.5–15.4)
MCH RBC QN AUTO: 29.7 PG (ref 26.8–34.3)
MCHC RBC AUTO-ENTMCNC: 32.4 G/DL (ref 31.4–37.4)
MCV RBC AUTO: 92 FL (ref 82–98)
PLATELET # BLD AUTO: 239 THOUSANDS/UL (ref 149–390)
PMV BLD AUTO: 10.9 FL (ref 8.9–12.7)
RBC # BLD AUTO: 4.34 MILLION/UL (ref 3.81–5.12)
WBC # BLD AUTO: 6.1 THOUSAND/UL (ref 4.31–10.16)

## 2021-03-17 PROCEDURE — 82306 VITAMIN D 25 HYDROXY: CPT | Performed by: FAMILY MEDICINE

## 2021-03-17 PROCEDURE — 99214 OFFICE O/P EST MOD 30 MIN: CPT | Performed by: FAMILY MEDICINE

## 2021-03-17 PROCEDURE — 36415 COLL VENOUS BLD VENIPUNCTURE: CPT | Performed by: FAMILY MEDICINE

## 2021-03-17 PROCEDURE — 83540 ASSAY OF IRON: CPT | Performed by: FAMILY MEDICINE

## 2021-03-17 PROCEDURE — 85027 COMPLETE CBC AUTOMATED: CPT | Performed by: FAMILY MEDICINE

## 2021-03-17 PROCEDURE — 82728 ASSAY OF FERRITIN: CPT | Performed by: FAMILY MEDICINE

## 2021-03-17 PROCEDURE — 84443 ASSAY THYROID STIM HORMONE: CPT | Performed by: FAMILY MEDICINE

## 2021-03-17 PROCEDURE — 83550 IRON BINDING TEST: CPT | Performed by: FAMILY MEDICINE

## 2021-03-17 NOTE — PROGRESS NOTES
Assessment/Plan:    Labs drawn as below  Will heed results  Patient being sent for chest x-ray PA and lateral at her request   Patient to continue present treatment  Follow up with specialists as scheduled and return to the office chelsie Palomo Diagnoses and all orders for this visit:    Fatigue, unspecified type  -     CBC and Platelet  -     TSH, 3rd generation with Free T4 reflex  -     Iron Panel (Includes Ferritin, Iron Sat%, Iron, and TIBC)  -     Vitamin D 25 hydroxy  -     XR chest pa & lateral; Future  -     Iron Saturation %  -     Ferritin    Major depressive disorder, recurrent, in partial remission (HCC)    Other abnormality of red blood cells   -     Iron Panel (Includes Ferritin, Iron Sat%, Iron, and TIBC)  -     Iron Saturation %  -     Ferritin    Vitamin D deficiency  -     Vitamin D 25 hydroxy          Subjective:      Patient ID: Michelle Zarco is a 64 y o  female  Patient is concerned about low iron in her blood  Patient donated blood at her [facility] through [facility] on 10/05/2020 and her Loralyn Mor crit was 38 and attempted to donate blood on 03/14/2021 and her hematocrit was 37 and a denied her from donating  Patient admits to chronically feeling fatigued  She relates this to her fibromyalgia  Patient follows with rheumatologist regularly  Patient request chest x-ray as she has occasional chest tightness  She is a nonsmoker although has lived with smokers throughout her life  Patient is up-to-date on colonoscopy last 1 done 11/08/2019 and recommended recheck in 5-10 years  She denies any obvious bleeding  Fatigue  This is a chronic problem  The current episode started more than 1 year ago  The problem has been gradually worsening  Associated symptoms include arthralgias, fatigue, myalgias, neck pain and weakness   Pertinent negatives include no anorexia, change in bowel habit, chills, diaphoresis, fever, headaches, nausea, numbness, rash, vertigo, visual change or vomiting  The symptoms are aggravated by exertion  She has tried rest for the symptoms  The treatment provided moderate relief  The following portions of the patient's history were reviewed and updated as appropriate: allergies, current medications, past family history, past medical history, past social history, past surgical history and problem list     Review of Systems   Constitutional: Positive for fatigue  Negative for chills, diaphoresis and fever  Gastrointestinal: Negative for anorexia, blood in stool, change in bowel habit, nausea and vomiting  Endocrine: Positive for cold intolerance and heat intolerance  Genitourinary: Negative for hematuria and vaginal bleeding  Musculoskeletal: Positive for arthralgias, myalgias and neck pain  Skin: Negative for rash  Neurological: Positive for weakness  Negative for dizziness, vertigo, light-headedness, numbness and headaches  Hematological: Negative for adenopathy  Does not bruise/bleed easily  Psychiatric/Behavioral: Positive for decreased concentration, dysphoric mood and sleep disturbance  Negative for confusion  The patient is nervous/anxious  Objective:      /74 (BP Location: Left arm, Patient Position: Sitting, Cuff Size: Adult)   Pulse 74   Temp 97 5 °F (36 4 °C) (Temporal)   Wt 79 7 kg (175 lb 9 6 oz)   LMP  (LMP Unknown)   SpO2 97%   Breastfeeding No   BMI 28 34 kg/m²          Physical Exam  Constitutional:       General: She is not in acute distress  Appearance: Normal appearance  HENT:      Head: Normocephalic  Mouth/Throat:      Mouth: Mucous membranes are moist    Eyes:      General: No scleral icterus  Conjunctiva/sclera: Conjunctivae normal    Neck:      Musculoskeletal: Neck supple  Muscular tenderness present  Vascular: No carotid bruit  Cardiovascular:      Rate and Rhythm: Normal rate and regular rhythm     Pulmonary:      Effort: Pulmonary effort is normal       Breath sounds: Normal breath sounds  Abdominal:      Palpations: Abdomen is soft  Musculoskeletal:      Right lower leg: No edema  Left lower leg: No edema  Lymphadenopathy:      Cervical: No cervical adenopathy  Skin:     General: Skin is warm and dry  Neurological:      General: No focal deficit present  Mental Status: She is alert and oriented to person, place, and time  Psychiatric:         Mood and Affect: Mood normal          BMI Counseling: Body mass index is 28 34 kg/m²  The BMI is above normal  Nutrition recommendations include reducing portion sizes, decreasing overall calorie intake, 3-5 servings of fruits/vegetables daily, reducing fast food intake, consuming healthier snacks, decreasing soda and/or juice intake, moderation in carbohydrate intake and reducing intake of saturated fat and trans fat  Exercise recommendations include moderate aerobic physical activity for 150 minutes/week

## 2021-03-17 NOTE — TELEPHONE ENCOUNTER
Upon review of the In Basket request we were able to locate, review, and update the patient chart as requested for Pap Smear (HPV) aka Cervical Cancer Screening  Any additional questions or concerns should be emailed to the Practice Liaisons via [email or web address] email, please do not reply via In Basket      Thank you  Mabel Mclean MA

## 2021-03-17 NOTE — TELEPHONE ENCOUNTER
----- Message from [facility] sent at 3/16/2021  3:17 PM EDT -----  03/16/21 3:17 PM    Hello, our patient Dolores Litten has had Pap Smear (HPV) aka Cervical Cancer Screening completed/performed  Please assist in updating the patient chart by pulling the Care Everywhere (CE) document  The date of service is 07/09/2018       Thank you,  [address]

## 2021-03-18 LAB
FERRITIN SERPL-MCNC: 9 NG/ML (ref 8–388)
IRON SATN MFR SERPL: 21 %
IRON SERPL-MCNC: 79 UG/DL (ref 50–170)
TIBC SERPL-MCNC: 381 UG/DL (ref 250–450)
TSH SERPL DL<=0.05 MIU/L-ACNC: 1.84 UIU/ML (ref 0.36–3.74)

## 2021-03-19 ENCOUNTER — TELEPHONE (OUTPATIENT)
Dept: FAMILY MEDICINE CLINIC | Facility: CLINIC | Age: 61
End: 2021-03-19

## 2021-03-19 NOTE — TELEPHONE ENCOUNTER
Patient may add multivitamin with iron  Also may eat iron rich foods such as beans and dark green leafy vegetables such as spinach

## 2021-03-19 NOTE — TELEPHONE ENCOUNTER
Pt called stating that she viewed her results on my chart  Pt states that she seen her Ferritin level was at 9 and wants to know if she can do anything to bring it up? She also noticed that her iron was 78 which she thinks is on the lower side of normal  Pt asking what she can do to bring this up? Please advise on both things

## 2021-03-30 DIAGNOSIS — Z23 ENCOUNTER FOR IMMUNIZATION: ICD-10-CM

## 2021-04-05 ENCOUNTER — IMMUNIZATIONS (OUTPATIENT)
Dept: FAMILY MEDICINE CLINIC | Facility: HOSPITAL | Age: 61
End: 2021-04-05

## 2021-04-05 DIAGNOSIS — Z23 ENCOUNTER FOR IMMUNIZATION: Primary | ICD-10-CM

## 2021-04-05 PROCEDURE — 91301 SARS-COV-2 / COVID-19 MRNA VACCINE (MODERNA) 100 MCG: CPT

## 2021-04-05 PROCEDURE — 0011A SARS-COV-2 / COVID-19 MRNA VACCINE (MODERNA) 100 MCG: CPT

## 2021-04-07 ENCOUNTER — APPOINTMENT (OUTPATIENT)
Dept: RADIOLOGY | Facility: MEDICAL CENTER | Age: 61
End: 2021-04-07
Payer: MEDICARE

## 2021-04-07 DIAGNOSIS — R53.83 FATIGUE, UNSPECIFIED TYPE: ICD-10-CM

## 2021-04-07 PROCEDURE — 71046 X-RAY EXAM CHEST 2 VIEWS: CPT

## 2021-05-03 ENCOUNTER — IMMUNIZATIONS (OUTPATIENT)
Dept: FAMILY MEDICINE CLINIC | Facility: HOSPITAL | Age: 61
End: 2021-05-03

## 2021-05-03 DIAGNOSIS — Z23 ENCOUNTER FOR IMMUNIZATION: Primary | ICD-10-CM

## 2021-05-03 PROCEDURE — 0012A SARS-COV-2 / COVID-19 MRNA VACCINE (MODERNA) 100 MCG: CPT

## 2021-05-03 PROCEDURE — 91301 SARS-COV-2 / COVID-19 MRNA VACCINE (MODERNA) 100 MCG: CPT

## 2021-08-05 ENCOUNTER — TELEPHONE (OUTPATIENT)
Dept: FAMILY MEDICINE CLINIC | Facility: CLINIC | Age: 61
End: 2021-08-05

## 2021-08-09 ENCOUNTER — RA CDI HCC (OUTPATIENT)
Dept: OTHER | Facility: HOSPITAL | Age: 61
End: 2021-08-09

## 2021-08-09 NOTE — PROGRESS NOTES
[facility] 75  coding opportunities          Chart reviewed, no opportunity found: CHART REVIEWED, NO OPPORTUNITY FOUND                     Patients insurance company: [employer]

## 2021-08-13 ENCOUNTER — OFFICE VISIT (OUTPATIENT)
Dept: FAMILY MEDICINE CLINIC | Facility: CLINIC | Age: 61
End: 2021-08-13
Payer: MEDICARE

## 2021-08-13 VITALS
HEIGHT: 67 IN | BODY MASS INDEX: 27.12 KG/M2 | OXYGEN SATURATION: 98 % | HEART RATE: 76 BPM | SYSTOLIC BLOOD PRESSURE: 104 MMHG | TEMPERATURE: 97.8 F | DIASTOLIC BLOOD PRESSURE: 62 MMHG | WEIGHT: 172.8 LBS | RESPIRATION RATE: 16 BRPM

## 2021-08-13 DIAGNOSIS — F41.1 GENERALIZED ANXIETY DISORDER: ICD-10-CM

## 2021-08-13 DIAGNOSIS — J31.0 RHINITIS, UNSPECIFIED TYPE: ICD-10-CM

## 2021-08-13 DIAGNOSIS — Z12.31 SCREENING MAMMOGRAM, ENCOUNTER FOR: ICD-10-CM

## 2021-08-13 DIAGNOSIS — Z00.00 MEDICARE ANNUAL WELLNESS VISIT, SUBSEQUENT: Primary | ICD-10-CM

## 2021-08-13 DIAGNOSIS — M79.7 FIBROMYALGIA: ICD-10-CM

## 2021-08-13 PROBLEM — M51.26 DISPLACEMENT OF LUMBAR INTERVERTEBRAL DISC WITHOUT MYELOPATHY: Status: ACTIVE | Noted: 2021-08-13

## 2021-08-13 PROBLEM — F41.0 PANIC DISORDER WITHOUT AGORAPHOBIA: Status: ACTIVE | Noted: 2017-01-13

## 2021-08-13 PROBLEM — M51.37 DEGENERATION OF LUMBOSACRAL INTERVERTEBRAL DISC: Status: ACTIVE | Noted: 2021-08-13

## 2021-08-13 PROBLEM — M51.379 DEGENERATION OF LUMBOSACRAL INTERVERTEBRAL DISC: Status: ACTIVE | Noted: 2021-08-13

## 2021-08-13 PROCEDURE — G0438 PPPS, INITIAL VISIT: HCPCS | Performed by: FAMILY MEDICINE

## 2021-08-13 PROCEDURE — 99214 OFFICE O/P EST MOD 30 MIN: CPT | Performed by: FAMILY MEDICINE

## 2021-08-13 RX ORDER — FERROUS SULFATE 325(65) MG
1 TABLET ORAL DAILY
COMMUNITY

## 2021-08-13 NOTE — PATIENT INSTRUCTIONS
Medicare Preventive Visit Patient Instructions  Thank you for completing your Welcome to Medicare Visit or Medicare Annual Wellness Visit today  Your next wellness visit will be due in one year (8/14/2022)  The screening/preventive services that you may require over the next 5-10 years are detailed below  Some tests may not apply to you based off risk factors and/or age  Screening tests ordered at today's visit but not completed yet may show as past due  Also, please note that scanned in results may not display below  Preventive Screenings:  Service Recommendations Previous Testing/Comments   Colorectal Cancer Screening  * Colonoscopy    * Fecal Occult Blood Test (FOBT)/Fecal Immunochemical Test (FIT)  * Fecal DNA/Cologuard Test  * Flexible Sigmoidoscopy Age: 54-65 years old   Colonoscopy: every 10 years (may be performed more frequently if at higher risk)  OR  FOBT/FIT: every 1 year  OR  Cologuard: every 3 years  OR  Sigmoidoscopy: every 5 years  Screening may be recommended earlier than age 48 if at higher risk for colorectal cancer  Also, an individualized decision between you and your healthcare provider will decide whether screening between the ages of 74-80 would be appropriate  Colonoscopy: 11/08/2019  FOBT/FIT: Not on file  Cologuard: Not on file  Sigmoidoscopy: Not on file          Breast Cancer Screening Age: 36 years old  Frequency: every 1-2 years  Not required if history of left and right mastectomy Mammogram: 07/10/2019        Cervical Cancer Screening Between the ages of 21-29, pap smear recommended once every 3 years  Between the ages of 33-67, can perform pap smear with HPV co-testing every 5 years     Recommendations may differ for women with a history of total hysterectomy, cervical cancer, or abnormal pap smears in past  Pap Smear: 07/09/2018        Hepatitis C Screening Once for adults born between 1945 and 1965  More frequently in patients at high risk for Hepatitis C Hep C Antibody: 10/12/2017        Diabetes Screening 1-2 times per year if you're at risk for diabetes or have pre-diabetes Fasting glucose: No results in last 5 years   A1C: 5 7 %        Cholesterol Screening Once every 5 years if you don't have a lipid disorder  May order more often based on risk factors  Lipid panel: 07/26/2019          Other Preventive Screenings Covered by Medicare:  1  Abdominal Aortic Aneurysm (AAA) Screening: covered once if your at risk  You're considered to be at risk if you have a family history of AAA  2  Lung Cancer Screening: covers low dose CT scan once per year if you meet all of the following conditions: (1) Age 50-69; (2) No signs or symptoms of lung cancer; (3) Current smoker or have quit smoking within the last 15 years; (4) You have a tobacco smoking history of at least 30 pack years (packs per day multiplied by number of years you smoked); (5) You get a written order from a healthcare provider  3  Glaucoma Screening: covered annually if you're considered high risk: (1) You have diabetes OR (2) Family history of glaucoma OR (3)  aged 48 and older OR (3)  American aged 72 and older  3  Osteoporosis Screening: covered every 2 years if you meet one of the following conditions: (1) You're estrogen deficient and at risk for osteoporosis based off medical history and other findings; (2) Have a vertebral abnormality; (3) On glucocorticoid therapy for more than 3 months; (4) Have primary hyperparathyroidism; (5) On osteoporosis medications and need to assess response to drug therapy  · Last bone density test (DXA Scan): Not on file  5  HIV Screening: covered annually if you're between the age of 12-76  Also covered annually if you are younger than 13 and older than 72 with risk factors for HIV infection  For pregnant patients, it is covered up to 3 times per pregnancy      Immunizations:  Immunization Recommendations   Influenza Vaccine Annual influenza vaccination during flu season is recommended for all persons aged >= 6 months who do not have contraindications   Pneumococcal Vaccine (Prevnar and Pneumovax)  * Prevnar = PCV13  * Pneumovax = PPSV23   Adults 25-60 years old: 1-3 doses may be recommended based on certain risk factors  Adults 72 years old: Prevnar (PCV13) vaccine recommended followed by Pneumovax (PPSV23) vaccine  If already received PPSV23 since turning 65, then PCV13 recommended at least one year after PPSV23 dose  Hepatitis B Vaccine 3 dose series if at intermediate or high risk (ex: diabetes, end stage renal disease, liver disease)   Tetanus (Td) Vaccine - COST NOT COVERED BY MEDICARE PART B Following completion of primary series, a booster dose should be given every 10 years to maintain immunity against tetanus  Td may also be given as tetanus wound prophylaxis  Tdap Vaccine - COST NOT COVERED BY MEDICARE PART B Recommended at least once for all adults  For pregnant patients, recommended with each pregnancy  Shingles Vaccine (Shingrix) - COST NOT COVERED BY MEDICARE PART B  2 shot series recommended in those aged 48 and above     Health Maintenance Due:      Topic Date Due    HIV Screening  Never done    Cervical Cancer Screening  07/09/2021    Colorectal Cancer Screening  11/08/2024    Hepatitis C Screening  Completed     Immunizations Due:      Topic Date Due    DTaP,Tdap,and Td Vaccines (1 - Tdap) 05/29/1983    Influenza Vaccine (1) 09/01/2021     Advance Directives   What are advance directives? Advance directives are legal documents that state your wishes and plans for medical care  These plans are made ahead of time in case you lose your ability to make decisions for yourself  Advance directives can apply to any medical decision, such as the treatments you want, and if you want to donate organs  What are the types of advance directives? There are many types of advance directives, and each state has rules about how to use them   You may choose a combination of any of the following:  · Living will: This is a written record of the treatment you want  You can also choose which treatments you do not want, which to limit, and which to stop at a certain time  This includes surgery, medicine, IV fluid, and tube feedings  · Durable power of  for healthcare [place] [facility]): This is a written record that states who you want to make healthcare choices for you when you are unable to make them for yourself  This person, called a proxy, is usually a family member or a friend  You may choose more than 1 proxy  · Do not resuscitate (DNR) order:  A DNR order is used in case your heart stops beating or you stop breathing  It is a request not to have certain forms of treatment, such as CPR  A DNR order may be included in other types of advance directives  · Medical directive: This covers the care that you want if you are in a coma, near death, or unable to make decisions for yourself  You can list the treatments you want for each condition  Treatment may include pain medicine, surgery, blood transfusions, dialysis, IV or tube feedings, and a ventilator (breathing machine)  · Values history: This document has questions about your views, beliefs, and how you feel and think about life  This information can help others choose the care that you would choose  Why are advance directives important? An advance directive helps you control your care  Although spoken wishes may be used, it is better to have your wishes written down  Spoken wishes can be misunderstood, or not followed  Treatments may be given even if you do not want them  An advance directive may make it easier for your family to make difficult choices about your care  © Copyright [employer] 2018 Information is for End User's use only and may not be sold, redistributed or otherwise used for commercial purposes   All illustrations and images included in CareNotes® are the copyrighted property of [employer]  or [facility] Preventive Visit Patient Instructions  Thank you for completing your Welcome to Medicare Visit or Medicare Annual Wellness Visit today  Your next wellness visit will be due in one year (8/14/2022)  The screening/preventive services that you may require over the next 5-10 years are detailed below  Some tests may not apply to you based off risk factors and/or age  Screening tests ordered at today's visit but not completed yet may show as past due  Also, please note that scanned in results may not display below  Preventive Screenings:  Service Recommendations Previous Testing/Comments   Colorectal Cancer Screening  * Colonoscopy    * Fecal Occult Blood Test (FOBT)/Fecal Immunochemical Test (FIT)  * Fecal DNA/Cologuard Test  * Flexible Sigmoidoscopy Age: 54-65 years old   Colonoscopy: every 10 years (may be performed more frequently if at higher risk)  OR  FOBT/FIT: every 1 year  OR  Cologuard: every 3 years  OR  Sigmoidoscopy: every 5 years  Screening may be recommended earlier than age 48 if at higher risk for colorectal cancer  Also, an individualized decision between you and your healthcare provider will decide whether screening between the ages of 74-80 would be appropriate  Colonoscopy: 11/08/2019  FOBT/FIT: Not on file  Cologuard: Not on file  Sigmoidoscopy: Not on file          Breast Cancer Screening Age: 36 years old  Frequency: every 1-2 years  Not required if history of left and right mastectomy Mammogram: 07/10/2019        Cervical Cancer Screening Between the ages of 21-29, pap smear recommended once every 3 years  Between the ages of 33-67, can perform pap smear with HPV co-testing every 5 years     Recommendations may differ for women with a history of total hysterectomy, cervical cancer, or abnormal pap smears in past  Pap Smear: 07/09/2018        Hepatitis C Screening Once for adults born between 1945 and 1965  More frequently in patients at high risk for Hepatitis C Hep C Antibody: 10/12/2017        Diabetes Screening 1-2 times per year if you're at risk for diabetes or have pre-diabetes Fasting glucose: No results in last 5 years   A1C: 5 7 %        Cholesterol Screening Once every 5 years if you don't have a lipid disorder  May order more often based on risk factors  Lipid panel: 07/26/2019          Other Preventive Screenings Covered by Medicare:  6  Abdominal Aortic Aneurysm (AAA) Screening: covered once if your at risk  You're considered to be at risk if you have a family history of AAA  7  Lung Cancer Screening: covers low dose CT scan once per year if you meet all of the following conditions: (1) Age 50-69; (2) No signs or symptoms of lung cancer; (3) Current smoker or have quit smoking within the last 15 years; (4) You have a tobacco smoking history of at least 30 pack years (packs per day multiplied by number of years you smoked); (5) You get a written order from a healthcare provider  8  Glaucoma Screening: covered annually if you're considered high risk: (1) You have diabetes OR (2) Family history of glaucoma OR (3)  aged 48 and older OR (3)  American aged 72 and older  5  Osteoporosis Screening: covered every 2 years if you meet one of the following conditions: (1) You're estrogen deficient and at risk for osteoporosis based off medical history and other findings; (2) Have a vertebral abnormality; (3) On glucocorticoid therapy for more than 3 months; (4) Have primary hyperparathyroidism; (5) On osteoporosis medications and need to assess response to drug therapy  · Last bone density test (DXA Scan): Not on file  10  HIV Screening: covered annually if you're between the age of 12-76  Also covered annually if you are younger than 13 and older than 72 with risk factors for HIV infection  For pregnant patients, it is covered up to 3 times per pregnancy      Immunizations:  Immunization Recommendations   Influenza Vaccine Annual influenza vaccination during flu season is recommended for all persons aged >= 6 months who do not have contraindications   Pneumococcal Vaccine (Prevnar and Pneumovax)  * Prevnar = PCV13  * Pneumovax = PPSV23   Adults 25-60 years old: 1-3 doses may be recommended based on certain risk factors  Adults 72 years old: Prevnar (PCV13) vaccine recommended followed by Pneumovax (PPSV23) vaccine  If already received PPSV23 since turning 65, then PCV13 recommended at least one year after PPSV23 dose  Hepatitis B Vaccine 3 dose series if at intermediate or high risk (ex: diabetes, end stage renal disease, liver disease)   Tetanus (Td) Vaccine - COST NOT COVERED BY MEDICARE PART B Following completion of primary series, a booster dose should be given every 10 years to maintain immunity against tetanus  Td may also be given as tetanus wound prophylaxis  Tdap Vaccine - COST NOT COVERED BY MEDICARE PART B Recommended at least once for all adults  For pregnant patients, recommended with each pregnancy  Shingles Vaccine (Shingrix) - COST NOT COVERED BY MEDICARE PART B  2 shot series recommended in those aged 48 and above     Health Maintenance Due:      Topic Date Due    HIV Screening  Never done    Cervical Cancer Screening  07/09/2021    Colorectal Cancer Screening  11/08/2024    Hepatitis C Screening  Completed     Immunizations Due:      Topic Date Due    DTaP,Tdap,and Td Vaccines (1 - Tdap) 05/29/1983    Influenza Vaccine (1) 09/01/2021     Advance Directives   What are advance directives? Advance directives are legal documents that state your wishes and plans for medical care  These plans are made ahead of time in case you lose your ability to make decisions for yourself  Advance directives can apply to any medical decision, such as the treatments you want, and if you want to donate organs  What are the types of advance directives?   There are many types of advance directives, and each state has rules about how to use them  You may choose a combination of any of the following:  · Living will: This is a written record of the treatment you want  You can also choose which treatments you do not want, which to limit, and which to stop at a certain time  This includes surgery, medicine, IV fluid, and tube feedings  · Durable power of  for healthcare [place] [facility]): This is a written record that states who you want to make healthcare choices for you when you are unable to make them for yourself  This person, called a proxy, is usually a family member or a friend  You may choose more than 1 proxy  · Do not resuscitate (DNR) order:  A DNR order is used in case your heart stops beating or you stop breathing  It is a request not to have certain forms of treatment, such as CPR  A DNR order may be included in other types of advance directives  · Medical directive: This covers the care that you want if you are in a coma, near death, or unable to make decisions for yourself  You can list the treatments you want for each condition  Treatment may include pain medicine, surgery, blood transfusions, dialysis, IV or tube feedings, and a ventilator (breathing machine)  · Values history: This document has questions about your views, beliefs, and how you feel and think about life  This information can help others choose the care that you would choose  Why are advance directives important? An advance directive helps you control your care  Although spoken wishes may be used, it is better to have your wishes written down  Spoken wishes can be misunderstood, or not followed  Treatments may be given even if you do not want them  An advance directive may make it easier for your family to make difficult choices about your care  © Copyright [employer] 2018 Information is for End User's use only and may not be sold, redistributed or otherwise used for commercial purposes   All illustrations and images included in [place] are the copyrighted property of [employer]  or [facility] Preventive Visit Patient Instructions  Thank you for completing your Welcome to Medicare Visit or Medicare Annual Wellness Visit today  Your next wellness visit will be due in one year (8/14/2022)  The screening/preventive services that you may require over the next 5-10 years are detailed below  Some tests may not apply to you based off risk factors and/or age  Screening tests ordered at today's visit but not completed yet may show as past due  Also, please note that scanned in results may not display below  Preventive Screenings:  Service Recommendations Previous Testing/Comments   Colorectal Cancer Screening  * Colonoscopy    * Fecal Occult Blood Test (FOBT)/Fecal Immunochemical Test (FIT)  * Fecal DNA/Cologuard Test  * Flexible Sigmoidoscopy Age: 54-65 years old   Colonoscopy: every 10 years (may be performed more frequently if at higher risk)  OR  FOBT/FIT: every 1 year  OR  Cologuard: every 3 years  OR  Sigmoidoscopy: every 5 years  Screening may be recommended earlier than age 48 if at higher risk for colorectal cancer  Also, an individualized decision between you and your healthcare provider will decide whether screening between the ages of 74-80 would be appropriate  Colonoscopy: 11/08/2019  FOBT/FIT: Not on file  Cologuard: Not on file  Sigmoidoscopy: Not on file          Breast Cancer Screening Age: 36 years old  Frequency: every 1-2 years  Not required if history of left and right mastectomy Mammogram: 07/10/2019        Cervical Cancer Screening Between the ages of 21-29, pap smear recommended once every 3 years  Between the ages of 33-67, can perform pap smear with HPV co-testing every 5 years     Recommendations may differ for women with a history of total hysterectomy, cervical cancer, or abnormal pap smears in past  Pap Smear: 07/09/2018        Hepatitis C Screening Once for adults born between 1945 and 1965  More frequently in patients at high risk for Hepatitis C Hep C Antibody: 10/12/2017        Diabetes Screening 1-2 times per year if you're at risk for diabetes or have pre-diabetes Fasting glucose: No results in last 5 years   A1C: 5 7 %        Cholesterol Screening Once every 5 years if you don't have a lipid disorder  May order more often based on risk factors  Lipid panel: 07/26/2019          Other Preventive Screenings Covered by Medicare:  11  Abdominal Aortic Aneurysm (AAA) Screening: covered once if your at risk  You're considered to be at risk if you have a family history of AAA  12  Lung Cancer Screening: covers low dose CT scan once per year if you meet all of the following conditions: (1) Age 50-69; (2) No signs or symptoms of lung cancer; (3) Current smoker or have quit smoking within the last 15 years; (4) You have a tobacco smoking history of at least 30 pack years (packs per day multiplied by number of years you smoked); (5) You get a written order from a healthcare provider  15  Glaucoma Screening: covered annually if you're considered high risk: (1) You have diabetes OR (2) Family history of glaucoma OR (3)  aged 48 and older OR (3)  American aged 72 and older  15  Osteoporosis Screening: covered every 2 years if you meet one of the following conditions: (1) You're estrogen deficient and at risk for osteoporosis based off medical history and other findings; (2) Have a vertebral abnormality; (3) On glucocorticoid therapy for more than 3 months; (4) Have primary hyperparathyroidism; (5) On osteoporosis medications and need to assess response to drug therapy  · Last bone density test (DXA Scan): Not on file  15  HIV Screening: covered annually if you're between the age of 12-76  Also covered annually if you are younger than 13 and older than 72 with risk factors for HIV infection  For pregnant patients, it is covered up to 3 times per pregnancy      Immunizations:  Immunization Recommendations   Influenza Vaccine Annual influenza vaccination during flu season is recommended for all persons aged >= 6 months who do not have contraindications   Pneumococcal Vaccine (Prevnar and Pneumovax)  * Prevnar = PCV13  * Pneumovax = PPSV23   Adults 25-60 years old: 1-3 doses may be recommended based on certain risk factors  Adults 72 years old: Prevnar (PCV13) vaccine recommended followed by Pneumovax (PPSV23) vaccine  If already received PPSV23 since turning 65, then PCV13 recommended at least one year after PPSV23 dose  Hepatitis B Vaccine 3 dose series if at intermediate or high risk (ex: diabetes, end stage renal disease, liver disease)   Tetanus (Td) Vaccine - COST NOT COVERED BY MEDICARE PART B Following completion of primary series, a booster dose should be given every 10 years to maintain immunity against tetanus  Td may also be given as tetanus wound prophylaxis  Tdap Vaccine - COST NOT COVERED BY MEDICARE PART B Recommended at least once for all adults  For pregnant patients, recommended with each pregnancy  Shingles Vaccine (Shingrix) - COST NOT COVERED BY MEDICARE PART B  2 shot series recommended in those aged 48 and above     Health Maintenance Due:      Topic Date Due    HIV Screening  Never done    Cervical Cancer Screening  07/09/2021    Colorectal Cancer Screening  11/08/2024    Hepatitis C Screening  Completed     Immunizations Due:      Topic Date Due    DTaP,Tdap,and Td Vaccines (1 - Tdap) 05/29/1983    Influenza Vaccine (1) 09/01/2021     Advance Directives   What are advance directives? Advance directives are legal documents that state your wishes and plans for medical care  These plans are made ahead of time in case you lose your ability to make decisions for yourself  Advance directives can apply to any medical decision, such as the treatments you want, and if you want to donate organs  What are the types of advance directives?   There are many types of advance directives, and each state has rules about how to use them  You may choose a combination of any of the following:  · Living will: This is a written record of the treatment you want  You can also choose which treatments you do not want, which to limit, and which to stop at a certain time  This includes surgery, medicine, IV fluid, and tube feedings  · Durable power of  for healthcare [place] [facility]): This is a written record that states who you want to make healthcare choices for you when you are unable to make them for yourself  This person, called a proxy, is usually a family member or a friend  You may choose more than 1 proxy  · Do not resuscitate (DNR) order:  A DNR order is used in case your heart stops beating or you stop breathing  It is a request not to have certain forms of treatment, such as CPR  A DNR order may be included in other types of advance directives  · Medical directive: This covers the care that you want if you are in a coma, near death, or unable to make decisions for yourself  You can list the treatments you want for each condition  Treatment may include pain medicine, surgery, blood transfusions, dialysis, IV or tube feedings, and a ventilator (breathing machine)  · Values history: This document has questions about your views, beliefs, and how you feel and think about life  This information can help others choose the care that you would choose  Why are advance directives important? An advance directive helps you control your care  Although spoken wishes may be used, it is better to have your wishes written down  Spoken wishes can be misunderstood, or not followed  Treatments may be given even if you do not want them  An advance directive may make it easier for your family to make difficult choices about your care  © Copyright [employer] 2018 Information is for End User's use only and may not be sold, redistributed or otherwise used for commercial purposes   All illustrations and images included in CareNotes® are the copyrighted property of [employer]  or [facility] Preventive Visit Patient Instructions  Thank you for completing your Welcome to Medicare Visit or Medicare Annual Wellness Visit today  Your next wellness visit will be due in one year (8/14/2022)  The screening/preventive services that you may require over the next 5-10 years are detailed below  Some tests may not apply to you based off risk factors and/or age  Screening tests ordered at today's visit but not completed yet may show as past due  Also, please note that scanned in results may not display below  Preventive Screenings:  Service Recommendations Previous Testing/Comments   Colorectal Cancer Screening  * Colonoscopy    * Fecal Occult Blood Test (FOBT)/Fecal Immunochemical Test (FIT)  * Fecal DNA/Cologuard Test  * Flexible Sigmoidoscopy Age: 54-65 years old   Colonoscopy: every 10 years (may be performed more frequently if at higher risk)  OR  FOBT/FIT: every 1 year  OR  Cologuard: every 3 years  OR  Sigmoidoscopy: every 5 years  Screening may be recommended earlier than age 48 if at higher risk for colorectal cancer  Also, an individualized decision between you and your healthcare provider will decide whether screening between the ages of 74-80 would be appropriate  Colonoscopy: 11/08/2019  FOBT/FIT: Not on file  Cologuard: Not on file  Sigmoidoscopy: Not on file          Breast Cancer Screening Age: 36 years old  Frequency: every 1-2 years  Not required if history of left and right mastectomy Mammogram: 07/10/2019        Cervical Cancer Screening Between the ages of 21-29, pap smear recommended once every 3 years  Between the ages of 33-67, can perform pap smear with HPV co-testing every 5 years     Recommendations may differ for women with a history of total hysterectomy, cervical cancer, or abnormal pap smears in past  Pap Smear: 07/09/2018        Hepatitis C Screening Once for adults born between 1945 and 1965  More frequently in patients at high risk for Hepatitis C Hep C Antibody: 10/12/2017        Diabetes Screening 1-2 times per year if you're at risk for diabetes or have pre-diabetes Fasting glucose: No results in last 5 years   A1C: 5 7 %        Cholesterol Screening Once every 5 years if you don't have a lipid disorder  May order more often based on risk factors  Lipid panel: 07/26/2019          Other Preventive Screenings Covered by Medicare:  16  Abdominal Aortic Aneurysm (AAA) Screening: covered once if your at risk  You're considered to be at risk if you have a family history of AAA  17  Lung Cancer Screening: covers low dose CT scan once per year if you meet all of the following conditions: (1) Age 50-69; (2) No signs or symptoms of lung cancer; (3) Current smoker or have quit smoking within the last 15 years; (4) You have a tobacco smoking history of at least 30 pack years (packs per day multiplied by number of years you smoked); (5) You get a written order from a healthcare provider  25  Glaucoma Screening: covered annually if you're considered high risk: (1) You have diabetes OR (2) Family history of glaucoma OR (3)  aged 48 and older OR (3)  American aged 72 and older  23  Osteoporosis Screening: covered every 2 years if you meet one of the following conditions: (1) You're estrogen deficient and at risk for osteoporosis based off medical history and other findings; (2) Have a vertebral abnormality; (3) On glucocorticoid therapy for more than 3 months; (4) Have primary hyperparathyroidism; (5) On osteoporosis medications and need to assess response to drug therapy  · Last bone density test (DXA Scan): Not on file  20  HIV Screening: covered annually if you're between the age of 12-76  Also covered annually if you are younger than 13 and older than 72 with risk factors for HIV infection   For pregnant patients, it is covered up to 3 times per pregnancy  Immunizations:  Immunization Recommendations   Influenza Vaccine Annual influenza vaccination during flu season is recommended for all persons aged >= 6 months who do not have contraindications   Pneumococcal Vaccine (Prevnar and Pneumovax)  * Prevnar = PCV13  * Pneumovax = PPSV23   Adults 25-60 years old: 1-3 doses may be recommended based on certain risk factors  Adults 72 years old: Prevnar (PCV13) vaccine recommended followed by Pneumovax (PPSV23) vaccine  If already received PPSV23 since turning 65, then PCV13 recommended at least one year after PPSV23 dose  Hepatitis B Vaccine 3 dose series if at intermediate or high risk (ex: diabetes, end stage renal disease, liver disease)   Tetanus (Td) Vaccine - COST NOT COVERED BY MEDICARE PART B Following completion of primary series, a booster dose should be given every 10 years to maintain immunity against tetanus  Td may also be given as tetanus wound prophylaxis  Tdap Vaccine - COST NOT COVERED BY MEDICARE PART B Recommended at least once for all adults  For pregnant patients, recommended with each pregnancy  Shingles Vaccine (Shingrix) - COST NOT COVERED BY MEDICARE PART B  2 shot series recommended in those aged 48 and above     Health Maintenance Due:      Topic Date Due    HIV Screening  Never done    Cervical Cancer Screening  07/09/2021    Colorectal Cancer Screening  11/08/2024    Hepatitis C Screening  Completed     Immunizations Due:      Topic Date Due    DTaP,Tdap,and Td Vaccines (1 - Tdap) 05/29/1983    Influenza Vaccine (1) 09/01/2021     Advance Directives   What are advance directives? Advance directives are legal documents that state your wishes and plans for medical care  These plans are made ahead of time in case you lose your ability to make decisions for yourself  Advance directives can apply to any medical decision, such as the treatments you want, and if you want to donate organs     What are the types of advance directives? There are many types of advance directives, and each state has rules about how to use them  You may choose a combination of any of the following:  · Living will: This is a written record of the treatment you want  You can also choose which treatments you do not want, which to limit, and which to stop at a certain time  This includes surgery, medicine, IV fluid, and tube feedings  · Durable power of  for healthcare [facility]): This is a written record that states who you want to make healthcare choices for you when you are unable to make them for yourself  This person, called a proxy, is usually a family member or a friend  You may choose more than 1 proxy  · Do not resuscitate (DNR) order:  A DNR order is used in case your heart stops beating or you stop breathing  It is a request not to have certain forms of treatment, such as CPR  A DNR order may be included in other types of advance directives  · Medical directive: This covers the care that you want if you are in a coma, near death, or unable to make decisions for yourself  You can list the treatments you want for each condition  Treatment may include pain medicine, surgery, blood transfusions, dialysis, IV or tube feedings, and a ventilator (breathing machine)  · Values history: This document has questions about your views, beliefs, and how you feel and think about life  This information can help others choose the care that you would choose  Why are advance directives important? An advance directive helps you control your care  Although spoken wishes may be used, it is better to have your wishes written down  Spoken wishes can be misunderstood, or not followed  Treatments may be given even if you do not want them  An advance directive may make it easier for your family to make difficult choices about your care        © Copyright [employer] 2018 Information is for End User's use only and may not be sold, redistributed or otherwise used for commercial purposes  All illustrations and images included in CareNotes® are the copyrighted property of [employer]  or [facility] Preventive Visit Patient Instructions  Thank you for completing your Welcome to Medicare Visit or Medicare Annual Wellness Visit today  Your next wellness visit will be due in one year (8/14/2022)  The screening/preventive services that you may require over the next 5-10 years are detailed below  Some tests may not apply to you based off risk factors and/or age  Screening tests ordered at today's visit but not completed yet may show as past due  Also, please note that scanned in results may not display below  Preventive Screenings:  Service Recommendations Previous Testing/Comments   Colorectal Cancer Screening  * Colonoscopy    * Fecal Occult Blood Test (FOBT)/Fecal Immunochemical Test (FIT)  * Fecal DNA/Cologuard Test  * Flexible Sigmoidoscopy Age: 54-65 years old   Colonoscopy: every 10 years (may be performed more frequently if at higher risk)  OR  FOBT/FIT: every 1 year  OR  Cologuard: every 3 years  OR  Sigmoidoscopy: every 5 years  Screening may be recommended earlier than age 48 if at higher risk for colorectal cancer  Also, an individualized decision between you and your healthcare provider will decide whether screening between the ages of 74-80 would be appropriate  Colonoscopy: 11/08/2019  FOBT/FIT: Not on file  Cologuard: Not on file  Sigmoidoscopy: Not on file          Breast Cancer Screening Age: 36 years old  Frequency: every 1-2 years  Not required if history of left and right mastectomy Mammogram: 07/10/2019        Cervical Cancer Screening Between the ages of 21-29, pap smear recommended once every 3 years  Between the ages of 33-67, can perform pap smear with HPV co-testing every 5 years     Recommendations may differ for women with a history of total hysterectomy, cervical cancer, or abnormal pap smears in past  Pap Smear: 07/09/2018        Hepatitis C Screening Once for adults born between 1945 and 1965  More frequently in patients at high risk for Hepatitis C Hep C Antibody: 10/12/2017        Diabetes Screening 1-2 times per year if you're at risk for diabetes or have pre-diabetes Fasting glucose: No results in last 5 years   A1C: 5 7 %        Cholesterol Screening Once every 5 years if you don't have a lipid disorder  May order more often based on risk factors  Lipid panel: 07/26/2019          Other Preventive Screenings Covered by Medicare:  21  Abdominal Aortic Aneurysm (AAA) Screening: covered once if your at risk  You're considered to be at risk if you have a family history of AAA  22  Lung Cancer Screening: covers low dose CT scan once per year if you meet all of the following conditions: (1) Age 50-69; (2) No signs or symptoms of lung cancer; (3) Current smoker or have quit smoking within the last 15 years; (4) You have a tobacco smoking history of at least 30 pack years (packs per day multiplied by number of years you smoked); (5) You get a written order from a healthcare provider  21  Glaucoma Screening: covered annually if you're considered high risk: (1) You have diabetes OR (2) Family history of glaucoma OR (3)  aged 48 and older OR (3)  American aged 72 and older  25  Osteoporosis Screening: covered every 2 years if you meet one of the following conditions: (1) You're estrogen deficient and at risk for osteoporosis based off medical history and other findings; (2) Have a vertebral abnormality; (3) On glucocorticoid therapy for more than 3 months; (4) Have primary hyperparathyroidism; (5) On osteoporosis medications and need to assess response to drug therapy  · Last bone density test (DXA Scan): Not on file  25  HIV Screening: covered annually if you're between the age of 12-76  Also covered annually if you are younger than 13 and older than 72 with risk factors for HIV infection   For pregnant patients, it is covered up to 3 times per pregnancy  Immunizations:  Immunization Recommendations   Influenza Vaccine Annual influenza vaccination during flu season is recommended for all persons aged >= 6 months who do not have contraindications   Pneumococcal Vaccine (Prevnar and Pneumovax)  * Prevnar = PCV13  * Pneumovax = PPSV23   Adults 25-60 years old: 1-3 doses may be recommended based on certain risk factors  Adults 72 years old: Prevnar (PCV13) vaccine recommended followed by Pneumovax (PPSV23) vaccine  If already received PPSV23 since turning 65, then PCV13 recommended at least one year after PPSV23 dose  Hepatitis B Vaccine 3 dose series if at intermediate or high risk (ex: diabetes, end stage renal disease, liver disease)   Tetanus (Td) Vaccine - COST NOT COVERED BY MEDICARE PART B Following completion of primary series, a booster dose should be given every 10 years to maintain immunity against tetanus  Td may also be given as tetanus wound prophylaxis  Tdap Vaccine - COST NOT COVERED BY MEDICARE PART B Recommended at least once for all adults  For pregnant patients, recommended with each pregnancy  Shingles Vaccine (Shingrix) - COST NOT COVERED BY MEDICARE PART B  2 shot series recommended in those aged 48 and above     Health Maintenance Due:      Topic Date Due    HIV Screening  Never done    Cervical Cancer Screening  07/09/2021    Colorectal Cancer Screening  11/08/2024    Hepatitis C Screening  Completed     Immunizations Due:      Topic Date Due    DTaP,Tdap,and Td Vaccines (1 - Tdap) 05/29/1983    Influenza Vaccine (1) 09/01/2021     Advance Directives   What are advance directives? Advance directives are legal documents that state your wishes and plans for medical care  These plans are made ahead of time in case you lose your ability to make decisions for yourself   Advance directives can apply to any medical decision, such as the treatments you want, and if you want to donate organs  What are the types of advance directives? There are many types of advance directives, and each state has rules about how to use them  You may choose a combination of any of the following:  · Living will: This is a written record of the treatment you want  You can also choose which treatments you do not want, which to limit, and which to stop at a certain time  This includes surgery, medicine, IV fluid, and tube feedings  · Durable power of  for healthcare [facility] [place]): This is a written record that states who you want to make healthcare choices for you when you are unable to make them for yourself  This person, called a proxy, is usually a family member or a friend  You may choose more than 1 proxy  · Do not resuscitate (DNR) order:  A DNR order is used in case your heart stops beating or you stop breathing  It is a request not to have certain forms of treatment, such as CPR  A DNR order may be included in other types of advance directives  · Medical directive: This covers the care that you want if you are in a coma, near death, or unable to make decisions for yourself  You can list the treatments you want for each condition  Treatment may include pain medicine, surgery, blood transfusions, dialysis, IV or tube feedings, and a ventilator (breathing machine)  · Values history: This document has questions about your views, beliefs, and how you feel and think about life  This information can help others choose the care that you would choose  Why are advance directives important? An advance directive helps you control your care  Although spoken wishes may be used, it is better to have your wishes written down  Spoken wishes can be misunderstood, or not followed  Treatments may be given even if you do not want them  An advance directive may make it easier for your family to make difficult choices about your care        © Copyright "[employer] 2018 Information is for End User's use only and may not be sold, redistributed or otherwise used for commercial purposes  All illustrations and images included in CareNotes® are the copyrighted property of [employer]  or [facility] Preventive Visit Patient Instructions  Thank you for completing your Welcome to Medicare Visit or Medicare Annual Wellness Visit today  Your next wellness visit will be due in one year (8/14/2022)  The screening/preventive services that you may require over the next 5-10 years are detailed below  Some tests may not apply to you based off risk factors and/or age  Screening tests ordered at today's visit but not completed yet may show as past due  Also, please note that scanned in results may not display below  Preventive Screenings:  Service Recommendations Previous Testing/Comments   Colorectal Cancer Screening  * Colonoscopy    * Fecal Occult Blood Test (FOBT)/Fecal Immunochemical Test (FIT)  * Fecal DNA/Cologuard Test  * Flexible Sigmoidoscopy Age: 54-65 years old   Colonoscopy: every 10 years (may be performed more frequently if at higher risk)  OR  FOBT/FIT: every 1 year  OR  Cologuard: every 3 years  OR  Sigmoidoscopy: every 5 years  Screening may be recommended earlier than age 48 if at higher risk for colorectal cancer  Also, an individualized decision between you and your healthcare provider will decide whether screening between the ages of 74-80 would be appropriate  Colonoscopy: 11/08/2019  FOBT/FIT: Not on file  Cologuard: Not on file  Sigmoidoscopy: Not on file          Breast Cancer Screening Age: 36 years old  Frequency: every 1-2 years  Not required if history of left and right mastectomy Mammogram: 07/10/2019        Cervical Cancer Screening Between the ages of 21-29, pap smear recommended once every 3 years  Between the ages of 33-67, can perform pap smear with HPV co-testing every 5 years     Recommendations may differ for women with a history of total hysterectomy, cervical cancer, or abnormal pap smears in past  Pap Smear: 07/09/2018        Hepatitis C Screening Once for adults born between 1945 and 1965  More frequently in patients at high risk for Hepatitis C Hep C Antibody: 10/12/2017        Diabetes Screening 1-2 times per year if you're at risk for diabetes or have pre-diabetes Fasting glucose: No results in last 5 years   A1C: 5 7 %        Cholesterol Screening Once every 5 years if you don't have a lipid disorder  May order more often based on risk factors  Lipid panel: 07/26/2019          Other Preventive Screenings Covered by Medicare:  26  Abdominal Aortic Aneurysm (AAA) Screening: covered once if your at risk  You're considered to be at risk if you have a family history of AAA  32  Lung Cancer Screening: covers low dose CT scan once per year if you meet all of the following conditions: (1) Age 50-69; (2) No signs or symptoms of lung cancer; (3) Current smoker or have quit smoking within the last 15 years; (4) You have a tobacco smoking history of at least 30 pack years (packs per day multiplied by number of years you smoked); (5) You get a written order from a healthcare provider  29  Glaucoma Screening: covered annually if you're considered high risk: (1) You have diabetes OR (2) Family history of glaucoma OR (3)  aged 48 and older OR (3)  American aged 72 and older  34  Osteoporosis Screening: covered every 2 years if you meet one of the following conditions: (1) You're estrogen deficient and at risk for osteoporosis based off medical history and other findings; (2) Have a vertebral abnormality; (3) On glucocorticoid therapy for more than 3 months; (4) Have primary hyperparathyroidism; (5) On osteoporosis medications and need to assess response to drug therapy  · Last bone density test (DXA Scan): Not on file  30  HIV Screening: covered annually if you're between the age of 12-76   Also covered annually if you are younger than 13 and older than 72 with risk factors for HIV infection  For pregnant patients, it is covered up to 3 times per pregnancy  Immunizations:  Immunization Recommendations   Influenza Vaccine Annual influenza vaccination during flu season is recommended for all persons aged >= 6 months who do not have contraindications   Pneumococcal Vaccine (Prevnar and Pneumovax)  * Prevnar = PCV13  * Pneumovax = PPSV23   Adults 25-60 years old: 1-3 doses may be recommended based on certain risk factors  Adults 72 years old: Prevnar (PCV13) vaccine recommended followed by Pneumovax (PPSV23) vaccine  If already received PPSV23 since turning 65, then PCV13 recommended at least one year after PPSV23 dose  Hepatitis B Vaccine 3 dose series if at intermediate or high risk (ex: diabetes, end stage renal disease, liver disease)   Tetanus (Td) Vaccine - COST NOT COVERED BY MEDICARE PART B Following completion of primary series, a booster dose should be given every 10 years to maintain immunity against tetanus  Td may also be given as tetanus wound prophylaxis  Tdap Vaccine - COST NOT COVERED BY MEDICARE PART B Recommended at least once for all adults  For pregnant patients, recommended with each pregnancy  Shingles Vaccine (Shingrix) - COST NOT COVERED BY MEDICARE PART B  2 shot series recommended in those aged 48 and above     Health Maintenance Due:      Topic Date Due    HIV Screening  Never done    Cervical Cancer Screening  07/09/2021    Colorectal Cancer Screening  11/08/2024    Hepatitis C Screening  Completed     Immunizations Due:      Topic Date Due    DTaP,Tdap,and Td Vaccines (1 - Tdap) 05/29/1983    Influenza Vaccine (1) 09/01/2021     Advance Directives   What are advance directives? Advance directives are legal documents that state your wishes and plans for medical care  These plans are made ahead of time in case you lose your ability to make decisions for yourself   Advance directives can apply to any medical decision, such as the treatments you want, and if you want to donate organs  What are the types of advance directives? There are many types of advance directives, and each state has rules about how to use them  You may choose a combination of any of the following:  · Living will: This is a written record of the treatment you want  You can also choose which treatments you do not want, which to limit, and which to stop at a certain time  This includes surgery, medicine, IV fluid, and tube feedings  · Durable power of  for healthcare [place] [facility]): This is a written record that states who you want to make healthcare choices for you when you are unable to make them for yourself  This person, called a proxy, is usually a family member or a friend  You may choose more than 1 proxy  · Do not resuscitate (DNR) order:  A DNR order is used in case your heart stops beating or you stop breathing  It is a request not to have certain forms of treatment, such as CPR  A DNR order may be included in other types of advance directives  · Medical directive: This covers the care that you want if you are in a coma, near death, or unable to make decisions for yourself  You can list the treatments you want for each condition  Treatment may include pain medicine, surgery, blood transfusions, dialysis, IV or tube feedings, and a ventilator (breathing machine)  · Values history: This document has questions about your views, beliefs, and how you feel and think about life  This information can help others choose the care that you would choose  Why are advance directives important? An advance directive helps you control your care  Although spoken wishes may be used, it is better to have your wishes written down  Spoken wishes can be misunderstood, or not followed  Treatments may be given even if you do not want them  An advance directive may make it easier for your family to make difficult choices about your care        © Copyright [employer] 2018 Information is for End User's use only and may not be sold, redistributed or otherwise used for commercial purposes  All illustrations and images included in CareNotes® are the copyrighted property of [employer]  or [facility] Preventive Visit Patient Instructions  Thank you for completing your Welcome to Medicare Visit or Medicare Annual Wellness Visit today  Your next wellness visit will be due in one year (8/14/2022)  The screening/preventive services that you may require over the next 5-10 years are detailed below  Some tests may not apply to you based off risk factors and/or age  Screening tests ordered at today's visit but not completed yet may show as past due  Also, please note that scanned in results may not display below  Preventive Screenings:  Service Recommendations Previous Testing/Comments   Colorectal Cancer Screening  * Colonoscopy    * Fecal Occult Blood Test (FOBT)/Fecal Immunochemical Test (FIT)  * Fecal DNA/Cologuard Test  * Flexible Sigmoidoscopy Age: 54-65 years old   Colonoscopy: every 10 years (may be performed more frequently if at higher risk)  OR  FOBT/FIT: every 1 year  OR  Cologuard: every 3 years  OR  Sigmoidoscopy: every 5 years  Screening may be recommended earlier than age 48 if at higher risk for colorectal cancer  Also, an individualized decision between you and your healthcare provider will decide whether screening between the ages of 74-80 would be appropriate  Colonoscopy: 11/08/2019  FOBT/FIT: Not on file  Cologuard: Not on file  Sigmoidoscopy: Not on file          Breast Cancer Screening Age: 36 years old  Frequency: every 1-2 years  Not required if history of left and right mastectomy Mammogram: 07/10/2019        Cervical Cancer Screening Between the ages of 21-29, pap smear recommended once every 3 years  Between the ages of 33-67, can perform pap smear with HPV co-testing every 5 years     Recommendations may differ for women with a history of total hysterectomy, cervical cancer, or abnormal pap smears in past  Pap Smear: 07/09/2018        Hepatitis C Screening Once for adults born between 1945 and 1965  More frequently in patients at high risk for Hepatitis C Hep C Antibody: 10/12/2017        Diabetes Screening 1-2 times per year if you're at risk for diabetes or have pre-diabetes Fasting glucose: No results in last 5 years   A1C: 5 7 %        Cholesterol Screening Once every 5 years if you don't have a lipid disorder  May order more often based on risk factors  Lipid panel: 07/26/2019          Other Preventive Screenings Covered by Medicare:  31  Abdominal Aortic Aneurysm (AAA) Screening: covered once if your at risk  You're considered to be at risk if you have a family history of AAA  28  Lung Cancer Screening: covers low dose CT scan once per year if you meet all of the following conditions: (1) Age 50-69; (2) No signs or symptoms of lung cancer; (3) Current smoker or have quit smoking within the last 15 years; (4) You have a tobacco smoking history of at least 30 pack years (packs per day multiplied by number of years you smoked); (5) You get a written order from a healthcare provider  35  Glaucoma Screening: covered annually if you're considered high risk: (1) You have diabetes OR (2) Family history of glaucoma OR (3)  aged 48 and older OR (3)  American aged 72 and older  29  Osteoporosis Screening: covered every 2 years if you meet one of the following conditions: (1) You're estrogen deficient and at risk for osteoporosis based off medical history and other findings; (2) Have a vertebral abnormality; (3) On glucocorticoid therapy for more than 3 months; (4) Have primary hyperparathyroidism; (5) On osteoporosis medications and need to assess response to drug therapy  · Last bone density test (DXA Scan): Not on file  28  HIV Screening: covered annually if you're between the age of 12-76   Also covered annually if you are younger than 13 and older than 72 with risk factors for HIV infection  For pregnant patients, it is covered up to 3 times per pregnancy  Immunizations:  Immunization Recommendations   Influenza Vaccine Annual influenza vaccination during flu season is recommended for all persons aged >= 6 months who do not have contraindications   Pneumococcal Vaccine (Prevnar and Pneumovax)  * Prevnar = PCV13  * Pneumovax = PPSV23   Adults 25-60 years old: 1-3 doses may be recommended based on certain risk factors  Adults 72 years old: Prevnar (PCV13) vaccine recommended followed by Pneumovax (PPSV23) vaccine  If already received PPSV23 since turning 65, then PCV13 recommended at least one year after PPSV23 dose  Hepatitis B Vaccine 3 dose series if at intermediate or high risk (ex: diabetes, end stage renal disease, liver disease)   Tetanus (Td) Vaccine - COST NOT COVERED BY MEDICARE PART B Following completion of primary series, a booster dose should be given every 10 years to maintain immunity against tetanus  Td may also be given as tetanus wound prophylaxis  Tdap Vaccine - COST NOT COVERED BY MEDICARE PART B Recommended at least once for all adults  For pregnant patients, recommended with each pregnancy  Shingles Vaccine (Shingrix) - COST NOT COVERED BY MEDICARE PART B  2 shot series recommended in those aged 48 and above     Health Maintenance Due:      Topic Date Due    HIV Screening  Never done    Cervical Cancer Screening  07/09/2021    Colorectal Cancer Screening  11/08/2024    Hepatitis C Screening  Completed     Immunizations Due:      Topic Date Due    DTaP,Tdap,and Td Vaccines (1 - Tdap) 05/29/1983    Influenza Vaccine (1) 09/01/2021     Advance Directives   What are advance directives? Advance directives are legal documents that state your wishes and plans for medical care  These plans are made ahead of time in case you lose your ability to make decisions for yourself   Advance directives can apply to any medical decision, such as the treatments you want, and if you want to donate organs  What are the types of advance directives? There are many types of advance directives, and each state has rules about how to use them  You may choose a combination of any of the following:  · Living will: This is a written record of the treatment you want  You can also choose which treatments you do not want, which to limit, and which to stop at a certain time  This includes surgery, medicine, IV fluid, and tube feedings  · Durable power of  for healthcare [facility]): This is a written record that states who you want to make healthcare choices for you when you are unable to make them for yourself  This person, called a proxy, is usually a family member or a friend  You may choose more than 1 proxy  · Do not resuscitate (DNR) order:  A DNR order is used in case your heart stops beating or you stop breathing  It is a request not to have certain forms of treatment, such as CPR  A DNR order may be included in other types of advance directives  · Medical directive: This covers the care that you want if you are in a coma, near death, or unable to make decisions for yourself  You can list the treatments you want for each condition  Treatment may include pain medicine, surgery, blood transfusions, dialysis, IV or tube feedings, and a ventilator (breathing machine)  · Values history: This document has questions about your views, beliefs, and how you feel and think about life  This information can help others choose the care that you would choose  Why are advance directives important? An advance directive helps you control your care  Although spoken wishes may be used, it is better to have your wishes written down  Spoken wishes can be misunderstood, or not followed  Treatments may be given even if you do not want them  An advance directive may make it easier for your family to make difficult choices about your care  © Copyright [employer] 2018 Information is for End User's use only and may not be sold, redistributed or otherwise used for commercial purposes   All illustrations and images included in CareNotes® are the copyrighted property of A D A M , Inc  or Shanti Sinclair

## 2021-08-13 NOTE — PROGRESS NOTES
Assessment/Plan:   patient to schedule screening mammogram   Recommend patient follow-up with gynecologist   Patient to continue present treatment  Patient instructed to follow a low-fat and a low-carbohydrate diet and get regular exercise walking as tolerated  Follow up with specialists as scheduled and return the office in 1 year  Diagnoses and all orders for this visit:    Medicare annual wellness visit, subsequent    Generalized anxiety disorder    Fibromyalgia    Rhinitis, unspecified type    Screening mammogram, encounter for  -     Mammo screening bilateral w 3d & cad; Future    Other orders  -     ferrous sulfate 325 (65 Fe) mg tablet; Take 1 tablet by mouth daily          Subjective:      Patient ID: Leonora Hand is a 63 y o  female  Patient is here for annual Medicare wellness exam and follow-up chronic conditions  She is not fasting today  Patient complains of ongoing fatigue and problems with fibromyalgia  She follows with Rheumatology and Psychiatry regularly and sees dermatologist yearly  Patient previously saw gynecologist yearly although her gynecologist has moved out of the area  She admits to ongoing hot flashes  Recommend patient schedule appointment with Gynecology  Patient sees a massage therapist monthly  Patient is overdue for mammogram and recommend she schedule  She is up-to-date on colonoscopy  The following portions of the patient's history were reviewed and updated as appropriate: allergies, current medications, past family history, past medical history, past social history, past surgical history and problem list     Review of Systems   Constitutional: Positive for fatigue  Negative for activity change, appetite change, chills, diaphoresis, fever and unexpected weight change  HENT: Negative  Eyes: Negative  Respiratory: Negative for cough, chest tightness, shortness of breath and wheezing      Cardiovascular: Negative for chest pain, palpitations and leg swelling  Gastrointestinal: Negative for abdominal pain, blood in stool, constipation, diarrhea, nausea and vomiting  Endocrine: Positive for heat intolerance  Negative for cold intolerance  Genitourinary: Negative for difficulty urinating, dysuria, frequency, hematuria and urgency  Musculoskeletal: Positive for arthralgias, back pain, myalgias, neck pain and neck stiffness  Negative for gait problem and joint swelling  Skin: Negative  Neurological: Positive for light-headedness  Negative for dizziness, syncope, weakness and headaches  Hematological: Negative for adenopathy  Does not bruise/bleed easily  Psychiatric/Behavioral: Positive for decreased concentration, dysphoric mood and sleep disturbance  Negative for confusion  The patient is nervous/anxious  Objective:      /62   Pulse 76   Temp 97 8 °F (36 6 °C)   Resp 16   Ht 5' 6 75" (1 695 m)   Wt 78 4 kg (172 lb 12 8 oz)   LMP  (LMP Unknown)   SpO2 98%   BMI 27 27 kg/m²          Physical Exam  Constitutional:       General: She is not in acute distress  Appearance: Normal appearance  HENT:      Head: Normocephalic  Mouth/Throat:      Mouth: Mucous membranes are moist    Eyes:      General: No scleral icterus  Conjunctiva/sclera: Conjunctivae normal    Neck:      Vascular: No carotid bruit  Cardiovascular:      Rate and Rhythm: Normal rate and regular rhythm  Pulmonary:      Effort: Pulmonary effort is normal       Breath sounds: Normal breath sounds  Abdominal:      Tenderness: There is no abdominal tenderness  Musculoskeletal:      Cervical back: Neck supple  Right lower leg: No edema  Left lower leg: No edema  Lymphadenopathy:      Cervical: No cervical adenopathy  Skin:     General: Skin is warm and dry  Neurological:      General: No focal deficit present  Mental Status: She is alert and oriented to person, place, and time     Psychiatric:         Mood and Affect: Mood normal          Behavior: Behavior normal          Thought Content:  Thought content normal          Judgment: Judgment normal
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
## 2021-08-26 ENCOUNTER — HOSPITAL ENCOUNTER (OUTPATIENT)
Dept: MAMMOGRAPHY | Facility: MEDICAL CENTER | Age: 61
Discharge: HOME | End: 2021-08-26
Payer: MEDICARE

## 2021-08-26 VITALS — BODY MASS INDEX: 27.64 KG/M2 | HEIGHT: 66 IN | WEIGHT: 172 LBS

## 2021-08-26 DIAGNOSIS — Z12.31 SCREENING MAMMOGRAM, ENCOUNTER FOR: ICD-10-CM

## 2021-08-26 PROCEDURE — 77063 BREAST TOMOSYNTHESIS BI: CPT

## 2021-08-26 PROCEDURE — 77067 SCR MAMMO BI INCL CAD: CPT

## 2021-10-05 ENCOUNTER — IMMUNIZATIONS (OUTPATIENT)
Dept: FAMILY MEDICINE CLINIC | Facility: CLINIC | Age: 61
End: 2021-10-05
Payer: MEDICARE

## 2021-10-05 DIAGNOSIS — Z23 IMMUNIZATION DUE: Primary | ICD-10-CM

## 2021-10-05 PROCEDURE — 90682 RIV4 VACC RECOMBINANT DNA IM: CPT

## 2021-10-05 PROCEDURE — G0008 ADMIN INFLUENZA VIRUS VAC: HCPCS

## 2021-11-23 ENCOUNTER — HOSPITAL ENCOUNTER (OUTPATIENT)
Dept: ULTRASOUND IMAGING | Facility: CLINIC | Age: 61
Discharge: HOME | End: 2021-11-23
Payer: MEDICARE

## 2021-11-23 VITALS — HEIGHT: 66 IN | BODY MASS INDEX: 27.64 KG/M2 | WEIGHT: 172 LBS

## 2021-11-23 DIAGNOSIS — R92.8 ABNORMAL MAMMOGRAM: ICD-10-CM

## 2021-11-23 PROCEDURE — 76642 ULTRASOUND BREAST LIMITED: CPT

## 2022-08-04 ENCOUNTER — RA CDI HCC (OUTPATIENT)
Dept: OTHER | Facility: HOSPITAL | Age: 62
End: 2022-08-04

## 2022-08-04 NOTE — PROGRESS NOTES
[address]  coding opportunities       Chart reviewed, no opportunity found: CHART REVIEWED, NO OPPORTUNITY FOUND        Patients Insurance     Medicare Insurance: Medicare

## 2022-08-23 RX ORDER — TRIAMCINOLONE ACETONIDE 40 MG/ML
1 INJECTION, SUSPENSION INTRA-ARTICULAR; INTRAMUSCULAR
COMMUNITY

## 2022-08-24 ENCOUNTER — OFFICE VISIT (OUTPATIENT)
Dept: FAMILY MEDICINE CLINIC | Facility: CLINIC | Age: 62
End: 2022-08-24
Payer: MEDICARE

## 2022-08-24 VITALS
DIASTOLIC BLOOD PRESSURE: 72 MMHG | RESPIRATION RATE: 16 BRPM | BODY MASS INDEX: 27.32 KG/M2 | HEART RATE: 72 BPM | HEIGHT: 66 IN | WEIGHT: 170 LBS | SYSTOLIC BLOOD PRESSURE: 125 MMHG | OXYGEN SATURATION: 97 % | TEMPERATURE: 97.8 F

## 2022-08-24 DIAGNOSIS — E78.5 HYPERLIPIDEMIA, UNSPECIFIED HYPERLIPIDEMIA TYPE: ICD-10-CM

## 2022-08-24 DIAGNOSIS — E55.9 VITAMIN D DEFICIENCY: ICD-10-CM

## 2022-08-24 DIAGNOSIS — F33.41 MAJOR DEPRESSIVE DISORDER, RECURRENT, IN PARTIAL REMISSION (HCC): ICD-10-CM

## 2022-08-24 DIAGNOSIS — Z78.0 MENOPAUSE: ICD-10-CM

## 2022-08-24 DIAGNOSIS — F41.1 GENERALIZED ANXIETY DISORDER: ICD-10-CM

## 2022-08-24 DIAGNOSIS — R53.83 FATIGUE, UNSPECIFIED TYPE: ICD-10-CM

## 2022-08-24 DIAGNOSIS — Z12.31 SCREENING MAMMOGRAM, ENCOUNTER FOR: ICD-10-CM

## 2022-08-24 DIAGNOSIS — Z00.00 MEDICARE ANNUAL WELLNESS VISIT, SUBSEQUENT: Primary | ICD-10-CM

## 2022-08-24 DIAGNOSIS — M79.7 FIBROMYALGIA: ICD-10-CM

## 2022-08-24 PROCEDURE — 99214 OFFICE O/P EST MOD 30 MIN: CPT | Performed by: FAMILY MEDICINE

## 2022-08-24 PROCEDURE — G0439 PPPS, SUBSEQ VISIT: HCPCS | Performed by: FAMILY MEDICINE
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
## 2022-08-24 NOTE — PROGRESS NOTES
Assessment/Plan:  Patient given lab requisition for fasting labs as below  Patient to schedule mammogram and DEXA bone scan  Patient to continue present treatment  Patient instructed to follow a low-fat, low-salt and a low-carbohydrate diet and get regular exercise walking as tolerated  Follow up with specialists as scheduled and return the office in 1 year  Diagnoses and all orders for this visit:    Medicare annual wellness visit, subsequent    Fibromyalgia    Hyperlipidemia, unspecified hyperlipidemia type  -     Comprehensive metabolic panel; Future  -     Lipid panel; Future  -     UA w Reflex to Microscopic w Reflex to Culture -Lab Collect; Future    Major depressive disorder, recurrent, in partial remission (HCC)    Generalized anxiety disorder  -     TSH, 3rd generation with Free T4 reflex; Future    Fatigue, unspecified type  -     CBC and Platelet; Future  -     TSH, 3rd generation with Free T4 reflex; Future    Menopause  -     DXA bone density spine hip and pelvis; Future    Vitamin D deficiency  -     Vitamin D 25 hydroxy; Future    Screening mammogram, encounter for  -     Mammo screening bilateral w 3d & cad; Future    Other orders  -     triamcinolone acetonide (KENALOG-40) 40 mg/mL; 1 mL  -     Magnesium 400 MG CAPS          Subjective:      Patient ID: Abad Carreon is a 63 y o  female  Patient is here for annual Medicare wellness exam and follow-up of chronic conditions  Patient admits to ongoing fatigue  She requests fasting labs  Patient is up-to-date on colonoscopy  Patient is due for mammogram and DEXA bone scan  Patient follows with rheumatologist regularly  Anxiety  Presents for follow-up visit  Symptoms include decreased concentration, depressed mood, excessive worry, insomnia, irritability, nervous/anxious behavior and restlessness  Patient reports no chest pain, confusion, hyperventilation, palpitations, panic, shortness of breath or suicidal ideas   Symptoms occur most days  The severity of symptoms is interfering with daily activities  The quality of sleep is poor  Nighttime awakenings: several            The following portions of the patient's history were reviewed and updated as appropriate: allergies, current medications, past family history, past medical history, past social history, past surgical history and problem list     Review of Systems   Constitutional: Positive for irritability  Respiratory: Negative for shortness of breath  Cardiovascular: Negative for chest pain and palpitations  Psychiatric/Behavioral: Positive for decreased concentration  Negative for confusion and suicidal ideas  The patient is nervous/anxious and has insomnia  Objective:      /72 (BP Location: Left arm, Patient Position: Sitting, Cuff Size: Standard)   Pulse 72   Temp 97 8 °F (36 6 °C) (Skin)   Resp 16   Ht 5' 6" (1 676 m)   Wt 77 1 kg (170 lb)   LMP  (LMP Unknown)   SpO2 97%   BMI 27 44 kg/m²          Physical Exam  Constitutional:       General: She is not in acute distress  Appearance: Normal appearance  HENT:      Head: Normocephalic  Mouth/Throat:      Mouth: Mucous membranes are moist    Eyes:      General: No scleral icterus  Conjunctiva/sclera: Conjunctivae normal    Neck:      Vascular: No carotid bruit  Cardiovascular:      Rate and Rhythm: Normal rate and regular rhythm  Pulmonary:      Effort: Pulmonary effort is normal       Breath sounds: Normal breath sounds  Abdominal:      Palpations: Abdomen is soft  Tenderness: There is no abdominal tenderness  Musculoskeletal:      Cervical back: Neck supple  Right lower leg: No edema  Left lower leg: No edema  Lymphadenopathy:      Cervical: No cervical adenopathy  Skin:     General: Skin is warm and dry  Neurological:      General: No focal deficit present  Mental Status: She is alert and oriented to person, place, and time     Psychiatric:         Mood and Affect: Mood normal          Behavior: Behavior normal          Thought Content: Thought content normal          Judgment: Judgment normal          Answers for HPI/ROS submitted by the patient on 8/23/2022  How would you rate your overall health?: fair  Compared to last year, how is your physical health?: slightly worse  In general, how satisfied are you with your life?: dissatisfied  Compared to last year, how is your eyesight?: slightly worse  Compared to last year, how is your hearing?: same  Compared to last year, how is your emotional/mental health?: same  How often is anger a problem for you?: sometimes  How often do you feel unusually tired/fatigued?: always  In the past 7 days, how much pain have you experienced?: a lot  If you answered "some" or "a lot", please rate the severity of your pain on a scale of 1 to 10 (1 being the least severe pain and 10 being the most intense pain)  : 6/10  In the past 6 months, have you lost or gained 10 pounds without trying?: No  Additional Comments: i dont weigh myself on regular basis  One or more falls in the last year: No  In the past 6 months, have you accidentally leaked urine?: Yes  Additional Comments: when i sneeze hard  Do you have trouble with the stairs inside or outside your home?: Yes  Does your home have working smoke alarms?: Yes  Does your home have a carbon monoxide monitor?: No  Which safety hazards (if any) have you experienced in your home? Please select all that apply : none  How would you describe your current diet?  Please select all that apply : Low Carb  Additional Comments: started back on low carb food plan to get wt down and hopefully feel better  In addition to prescription medications, are you taking any over-the-counter supplements?: Yes  If yes, what supplements are you taking?: vit d 5000, magnesium 400  Can you manage your medications?: Yes  Are you currently taking any opioid medications?: No  Can you walk and transfer into and out of your bed and chair?: Yes  Can you dress and groom yourself?: Yes  Can you bathe or shower yourself?: Yes  Can you feed yourself?: Yes  Can you do your laundry/ housekeeping?: Yes  Can you manage your money, pay your bills, and track your expenses?: Yes  Can you make your own meals?: Yes  Can you do your own shopping?: Yes  Additional Comments: Dad food shops daily, makes meals if needed, do laundry, take me to doctor appointments when needed  Mom puts massage oil on my back to help my pain  I put bills aside when I feeling overwhelmed/pain   Use shower chair, stay in s when Pain level high  Within the last 12 months, have you had any hospitalizations or Emergency Department visits?: No  Do you have a living will?: Yes  Do you have a Durable POA (Power of ) for healthcare decisions?: Yes  Do you have an Advanced Directive for end of life decisions?: Yes  How often have you used an illegal drug (including marijuana) or a prescription medication for non-medical reasons in the past year?: never  What is the typical number of drinks you consume in a day?: 0  What is the typical number of drinks you consume in a week?: 0  How often did you have a drink containing alcohol in the past year?: never  How many drinks did you have on a typical day  when you were drinking in the past year?: 0  How often did you have 6 or more drinks on one occasion in the past year?: never

## 2022-08-24 NOTE — PATIENT INSTRUCTIONS
Medicare Preventive Visit Patient Instructions  Thank you for completing your Welcome to Medicare Visit or Medicare Annual Wellness Visit today  Your next wellness visit will be due in one year (8/25/2023)  The screening/preventive services that you may require over the next 5-10 years are detailed below  Some tests may not apply to you based off risk factors and/or age  Screening tests ordered at today's visit but not completed yet may show as past due  Also, please note that scanned in results may not display below  Preventive Screenings:  Service Recommendations Previous Testing/Comments   Colorectal Cancer Screening  * Colonoscopy    * Fecal Occult Blood Test (FOBT)/Fecal Immunochemical Test (FIT)  * Fecal DNA/Cologuard Test  * Flexible Sigmoidoscopy Age: 39-70 years old   Colonoscopy: every 10 years (may be performed more frequently if at higher risk)  OR  FOBT/FIT: every 1 year  OR  Cologuard: every 3 years  OR  Sigmoidoscopy: every 5 years  Screening may be recommended earlier than age 39 if at higher risk for colorectal cancer  Also, an individualized decision between you and your healthcare provider will decide whether screening between the ages of 74-80 would be appropriate  Colonoscopy: 11/08/2019  FOBT/FIT: Not on file  Cologuard: Not on file  Sigmoidoscopy: Not on file    Screening Current     Breast Cancer Screening Age: 36 years old  Frequency: every 1-2 years  Not required if history of left and right mastectomy Mammogram: 08/26/2021    Screening Current   Cervical Cancer Screening Between the ages of 21-29, pap smear recommended once every 3 years  Between the ages of 33-67, can perform pap smear with HPV co-testing every 5 years     Recommendations may differ for women with a history of total hysterectomy, cervical cancer, or abnormal pap smears in past  Pap Smear: 07/09/2018        Hepatitis C Screening Once for adults born between 1945 and 1965  More frequently in patients at high risk for Hepatitis C Hep C Antibody: 10/12/2017    Screening Current   Diabetes Screening 1-2 times per year if you're at risk for diabetes or have pre-diabetes Fasting glucose: No results in last 5 years (No results in last 5 years)  A1C: 5 7 % (7/26/2019)      Cholesterol Screening Once every 5 years if you don't have a lipid disorder  May order more often based on risk factors  Lipid panel: 07/26/2019    Screening Not Indicated  History Lipid Disorder     Other Preventive Screenings Covered by Medicare:  1  Abdominal Aortic Aneurysm (AAA) Screening: covered once if your at risk  You're considered to be at risk if you have a family history of AAA  2  Lung Cancer Screening: covers low dose CT scan once per year if you meet all of the following conditions: (1) Age 50-69; (2) No signs or symptoms of lung cancer; (3) Current smoker or have quit smoking within the last 15 years; (4) You have a tobacco smoking history of at least 20 pack years (packs per day multiplied by number of years you smoked); (5) You get a written order from a healthcare provider  3  Glaucoma Screening: covered annually if you're considered high risk: (1) You have diabetes OR (2) Family history of glaucoma OR (3)  aged 48 and older OR (3)  American aged 72 and older  3  Osteoporosis Screening: covered every 2 years if you meet one of the following conditions: (1) You're estrogen deficient and at risk for osteoporosis based off medical history and other findings; (2) Have a vertebral abnormality; (3) On glucocorticoid therapy for more than 3 months; (4) Have primary hyperparathyroidism; (5) On osteoporosis medications and need to assess response to drug therapy  · Last bone density test (DXA Scan): Not on file  5  HIV Screening: covered annually if you're between the age of 12-76  Also covered annually if you are younger than 13 and older than 72 with risk factors for HIV infection   For pregnant patients, it is covered up to 3 times per pregnancy  Immunizations:  Immunization Recommendations   Influenza Vaccine Annual influenza vaccination during flu season is recommended for all persons aged >= 6 months who do not have contraindications   Pneumococcal Vaccine   * Pneumococcal conjugate vaccine = PCV13 (Prevnar 13), PCV15 (Vaxneuvance), PCV20 (Prevnar 20)  * Pneumococcal polysaccharide vaccine = PPSV23 (Pneumovax) Adults 25-60 years old: 1-3 doses may be recommended based on certain risk factors  Adults 72 years old: 1-2 doses may be recommended based off what pneumonia vaccine you previously received   Hepatitis B Vaccine 3 dose series if at intermediate or high risk (ex: diabetes, end stage renal disease, liver disease)   Tetanus (Td) Vaccine - COST NOT COVERED BY MEDICARE PART B Following completion of primary series, a booster dose should be given every 10 years to maintain immunity against tetanus  Td may also be given as tetanus wound prophylaxis  Tdap Vaccine - COST NOT COVERED BY MEDICARE PART B Recommended at least once for all adults  For pregnant patients, recommended with each pregnancy  Shingles Vaccine (Shingrix) - COST NOT COVERED BY MEDICARE PART B  2 shot series recommended in those aged 48 and above     Health Maintenance Due:      Topic Date Due    HIV Screening  Never done    Cervical Cancer Screening  07/09/2021    Colorectal Cancer Screening  11/08/2024    Hepatitis C Screening  Completed     Immunizations Due:      Topic Date Due    COVID-19 Vaccine (3 - Booster for Moderna series) 10/03/2021    Influenza Vaccine (1) 09/01/2022     Advance Directives   What are advance directives? Advance directives are legal documents that state your wishes and plans for medical care  These plans are made ahead of time in case you lose your ability to make decisions for yourself  Advance directives can apply to any medical decision, such as the treatments you want, and if you want to donate organs     What are the types of advance directives? There are many types of advance directives, and each state has rules about how to use them  You may choose a combination of any of the following:  · Living will: This is a written record of the treatment you want  You can also choose which treatments you do not want, which to limit, and which to stop at a certain time  This includes surgery, medicine, IV fluid, and tube feedings  · Durable power of  for healthcare [facility]): This is a written record that states who you want to make healthcare choices for you when you are unable to make them for yourself  This person, called a proxy, is usually a family member or a friend  You may choose more than 1 proxy  · Do not resuscitate (DNR) order:  A DNR order is used in case your heart stops beating or you stop breathing  It is a request not to have certain forms of treatment, such as CPR  A DNR order may be included in other types of advance directives  · Medical directive: This covers the care that you want if you are in a coma, near death, or unable to make decisions for yourself  You can list the treatments you want for each condition  Treatment may include pain medicine, surgery, blood transfusions, dialysis, IV or tube feedings, and a ventilator (breathing machine)  · Values history: This document has questions about your views, beliefs, and how you feel and think about life  This information can help others choose the care that you would choose  Why are advance directives important? An advance directive helps you control your care  Although spoken wishes may be used, it is better to have your wishes written down  Spoken wishes can be misunderstood, or not followed  Treatments may be given even if you do not want them  An advance directive may make it easier for your family to make difficult choices about your care  Urinary Incontinence   Urinary incontinence (UI)  is when you lose control of your bladder   UI develops because your bladder cannot store or empty urine properly  The 3 most common types of UI are stress incontinence, urge incontinence, or both  Medicines:   · May be given to help strengthen your bladder control  Report any side effects of medication to your healthcare provider  Do pelvic muscle exercises often:  Your pelvic muscles help you stop urinating  Squeeze these muscles tight for 5 seconds, then relax for 5 seconds  Gradually work up to squeezing for 10 seconds  Do 3 sets of 15 repetitions a day, or as directed  This will help strengthen your pelvic muscles and improve bladder control  Train your bladder:  Go to the bathroom at set times, such as every 2 hours, even if you do not feel the urge to go  You can also try to hold your urine when you feel the urge to go  For example, hold your urine for 5 minutes when you feel the urge to go  As that becomes easier, hold your urine for 10 minutes  Self-care:   · Keep a UI record  Write down how often you leak urine and how much you leak  Make a note of what you were doing when you leaked urine  · Drink liquids as directed  You may need to limit the amount of liquid you drink to help control your urine leakage  Do not drink any liquid right before you go to bed  Limit or do not have drinks that contain caffeine or alcohol  · Prevent constipation  Eat a variety of high-fiber foods  Good examples are high-fiber cereals, beans, vegetables, and whole-grain breads  Walking is the best way to trigger your intestines to have a bowel movement  · Exercise regularly and maintain a healthy weight  Weight loss and exercise will decrease pressure on your bladder and help you control your leakage  · Use a catheter as directed  to help empty your bladder  A catheter is a tiny, plastic tube that is put into your bladder to drain your urine  · Go to behavior therapy as directed    Behavior therapy may be used to help you learn to control your urge to urinate  Weight Management   Why it is important to manage your weight:  Being overweight increases your risk of health conditions such as heart disease, high blood pressure, type 2 diabetes, and certain types of cancer  It can also increase your risk for osteoarthritis, sleep apnea, and other respiratory problems  Aim for a slow, steady weight loss  Even a small amount of weight loss can lower your risk of health problems  How to lose weight safely:  A safe and healthy way to lose weight is to eat fewer calories and get regular exercise  You can lose up about 1 pound a week by decreasing the number of calories you eat by 500 calories each day  Healthy meal plan for weight management:  A healthy meal plan includes a variety of foods, contains fewer calories, and helps you stay healthy  A healthy meal plan includes the following:  · Eat whole-grain foods more often  A healthy meal plan should contain fiber  Fiber is the part of grains, fruits, and vegetables that is not broken down by your body  Whole-grain foods are healthy and provide extra fiber in your diet  Some examples of whole-grain foods are whole-wheat breads and pastas, oatmeal, brown rice, and bulgur  · Eat a variety of vegetables every day  Include dark, leafy greens such as spinach, kale, gisell greens, and mustard greens  Eat yellow and orange vegetables such as carrots, sweet potatoes, and winter squash  · Eat a variety of fruits every day  Choose fresh or canned fruit (canned in its own juice or light syrup) instead of juice  Fruit juice has very little or no fiber  · Eat low-fat dairy foods  Drink fat-free (skim) milk or 1% milk  Eat fat-free yogurt and low-fat cottage cheese  Try low-fat cheeses such as mozzarella and other reduced-fat cheeses  · Choose meat and other protein foods that are low in fat  Choose beans or other legumes such as split peas or lentils   Choose fish, skinless poultry (chicken or turkey), or lean cuts of red meat (beef or pork)  Before you cook meat or poultry, cut off any visible fat  · Use less fat and oil  Try baking foods instead of frying them  Add less fat, such as margarine, sour cream, regular salad dressing and mayonnaise to foods  Eat fewer high-fat foods  Some examples of high-fat foods include french fries, doughnuts, ice cream, and cakes  · Eat fewer sweets  Limit foods and drinks that are high in sugar  This includes candy, cookies, regular soda, and sweetened drinks  Exercise:  Exercise at least 30 minutes per day on most days of the week  Some examples of exercise include walking, biking, dancing, and swimming  You can also fit in more physical activity by taking the stairs instead of the elevator or parking farther away from stores  Ask your healthcare provider about the best exercise plan for you  © Copyright [employer] 2018 Information is for End User's use only and may not be sold, redistributed or otherwise used for commercial purposes   All illustrations and images included in CareNotes® are the copyrighted property of [employer]  or [address] [place] [address]

## 2022-10-11 ENCOUNTER — HOSPITAL ENCOUNTER (OUTPATIENT)
Dept: MAMMOGRAPHY | Facility: MEDICAL CENTER | Age: 62
Discharge: HOME | End: 2022-10-11
Payer: MEDICARE

## 2022-10-11 VITALS — HEIGHT: 66 IN | BODY MASS INDEX: 26.68 KG/M2 | WEIGHT: 166 LBS

## 2022-10-11 DIAGNOSIS — Z12.31 SCREENING MAMMOGRAM, ENCOUNTER FOR: ICD-10-CM

## 2022-10-11 PROCEDURE — 77067 SCR MAMMO BI INCL CAD: CPT

## 2022-10-11 PROCEDURE — 77063 BREAST TOMOSYNTHESIS BI: CPT

## 2022-10-18 ENCOUNTER — IMMUNIZATIONS (OUTPATIENT)
Dept: FAMILY MEDICINE CLINIC | Facility: CLINIC | Age: 62
End: 2022-10-18
Payer: MEDICARE

## 2022-10-18 DIAGNOSIS — Z23 NEED FOR INFLUENZA VACCINATION: Primary | ICD-10-CM

## 2022-10-18 PROCEDURE — 90682 RIV4 VACC RECOMBINANT DNA IM: CPT

## 2022-10-18 PROCEDURE — G0008 ADMIN INFLUENZA VIRUS VAC: HCPCS

## 2023-08-22 ENCOUNTER — RA CDI HCC (OUTPATIENT)
Dept: OTHER | Facility: HOSPITAL | Age: 63
End: 2023-08-22

## 2023-08-25 RX ORDER — LIDOCAINE HYDROCHLORIDE 10 MG/ML
INJECTION, SOLUTION INFILTRATION; PERINEURAL
COMMUNITY
Start: 2023-07-21

## 2023-08-28 ENCOUNTER — OFFICE VISIT (OUTPATIENT)
Dept: FAMILY MEDICINE CLINIC | Facility: CLINIC | Age: 63
End: 2023-08-28
Payer: MEDICARE

## 2023-08-28 VITALS
TEMPERATURE: 97.4 F | HEIGHT: 66 IN | SYSTOLIC BLOOD PRESSURE: 106 MMHG | BODY MASS INDEX: 25.75 KG/M2 | WEIGHT: 160.2 LBS | DIASTOLIC BLOOD PRESSURE: 70 MMHG | OXYGEN SATURATION: 98 % | RESPIRATION RATE: 16 BRPM | HEART RATE: 68 BPM

## 2023-08-28 DIAGNOSIS — Z00.00 MEDICARE ANNUAL WELLNESS VISIT, SUBSEQUENT: Primary | ICD-10-CM

## 2023-08-28 DIAGNOSIS — Z78.0 MENOPAUSE: ICD-10-CM

## 2023-08-28 DIAGNOSIS — F32.A DEPRESSION, UNSPECIFIED DEPRESSION TYPE: ICD-10-CM

## 2023-08-28 DIAGNOSIS — M79.7 FIBROMYALGIA: ICD-10-CM

## 2023-08-28 DIAGNOSIS — Z12.31 SCREENING MAMMOGRAM, ENCOUNTER FOR: ICD-10-CM

## 2023-08-28 DIAGNOSIS — F41.1 GENERALIZED ANXIETY DISORDER: ICD-10-CM

## 2023-08-28 DIAGNOSIS — E55.9 VITAMIN D DEFICIENCY: ICD-10-CM

## 2023-08-28 DIAGNOSIS — E78.5 HYPERLIPIDEMIA, UNSPECIFIED HYPERLIPIDEMIA TYPE: ICD-10-CM

## 2023-08-28 PROCEDURE — G0439 PPPS, SUBSEQ VISIT: HCPCS | Performed by: FAMILY MEDICINE

## 2023-08-28 PROCEDURE — 99214 OFFICE O/P EST MOD 30 MIN: CPT | Performed by: FAMILY MEDICINE

## 2023-08-28 NOTE — PROGRESS NOTES
Name: Prince Herrera      : 1960      MRN: [id number]  Encounter Provider: Stefany Caballero DO  Encounter Date: 2023   Encounter department: [address] [place] [address]   Patient given lab requisition for fasting labs as below. Patient to schedule mammogram and DEXA bone scan in 2 months. Patient to continue present treatment. Instructed to follow a low-fat and a low carbohydrate diet and to get regular aerobic exercise up to 150 minutes/week as tolerated. Follow-up with specialist as scheduled. Return to the office in 1 year. 1. Medicare annual wellness visit, subsequent    2. Hyperlipidemia, unspecified hyperlipidemia type  -     CBC and Platelet; Future  -     Comprehensive metabolic panel; Future  -     Lipid panel; Future  -     TSH, 3rd generation with Free T4 reflex; Future  -     UA w Reflex to Microscopic w Reflex to Culture -Lab Collect; Future; Expected date: 2023    3. Generalized anxiety disorder    4. Depression, unspecified depression type    5. Fibromyalgia    6. Menopause  -     DXA bone density spine hip and pelvis; Future; Expected date: 2023    7. Vitamin D deficiency  -     Vitamin D 25 hydroxy; Future    8. Screening mammogram, encounter for  -     Mammo screening bilateral w 3d & cad; Future; Expected date: 2023           Subjective      Patient is here for annual Medicare wellness exam and follow-up of chronic conditions. Patient's been feeling fairly well overall. No regular exercise program as she is limited secondary to her fibromyalgia. Patient did not complete DEXA bone scan last year as ordered. Anxiety  Presents for follow-up visit. Symptoms include decreased concentration, depressed mood, excessive worry, insomnia, irritability, nervous/anxious behavior, palpitations, panic and restlessness. Patient reports no chest pain, confusion, hyperventilation, shortness of breath or suicidal ideas.  Symptoms occur most days. The severity of symptoms is interfering with daily activities. The quality of sleep is fair. Review of Systems   Constitutional: Positive for irritability. Respiratory: Negative for shortness of breath. Cardiovascular: Positive for palpitations. Negative for chest pain. Psychiatric/Behavioral: Positive for decreased concentration. Negative for confusion and suicidal ideas. The patient is nervous/anxious and has insomnia. Current Outpatient Medications on File Prior to Visit   Medication Sig   • acetaminophen (TYLENOL) 325 mg tablet Take 650 mg by mouth every 6 (six) hours as needed for mild pain   • amitriptyline (ELAVIL) 10 mg tablet Take 10 mg by mouth daily at bedtime    • Azelaic Acid (Finacea) 15 % cream Apply topically 2 (two) times a day     • Cholecalciferol 5000 units capsule Take 2,000 Units by mouth daily   • citalopram (CeleXA) 20 mg tablet Take 30 mg by mouth daily    • ferrous sulfate 325 (65 Fe) mg tablet Take 1 tablet by mouth daily   • fluticasone (FLONASE) 50 mcg/act nasal spray 2 sprays into each nostril daily (Patient taking differently: 2 sprays into each nostril daily PRN)   • guaiFENesin (MUCINEX) 600 mg 12 hr tablet Take 1,200 mg by mouth every 12 (twelve) hours   • Lactobacillus Rhamnosus, GG, (CULTURELLE PO) Take 1 capsule by mouth Daily   • lidocaine (XYLOCAINE) 1 % BY INJECTION ROUTE   • LORazepam (ATIVAN) 0.5 mg tablet    • Magnesium 400 MG CAPS    • methocarbamol (ROBAXIN) 750 mg tablet methocarbamol 750 mg tablet   TAKE 1 TABLET BY MOUTH TWICE A DAY**NOT COVERED**   • methylPREDNISolone (MEDROL) 16 mg tablet Take 16 mg by mouth daily   • nabumetone (RELAFEN) 500 mg tablet TAKE 1 TABLET TWICE DAILY WITH FOOD.    • [DISCONTINUED] Pseudoephedrine-guaiFENesin (GUAIFENESIN 600/ PO)    • [DISCONTINUED] triamcinolone acetonide (KENALOG-40) 40 mg/mL 1 mL   • azelaic acid (AZELEX) 20 % cream Apply topically 2 (two) times a day   • [DISCONTINUED] diclofenac sodium (VOLTAREN) 1 % diclofenac 1 % topical gel   APPLY 2 GRAMS TO THE AFFECTED AREA(S) BY TOPICAL ROUTE 4 TIMES PER DAY (Patient not taking: No sig reported)   • [DISCONTINUED] fluocinonide (LIDEX) 0.05 % external solution Apply topically 2 (two) times a day       Objective     /70 (BP Location: Left arm, Patient Position: Sitting, Cuff Size: Standard)   Pulse 68   Temp (!) 97.4 °F (36.3 °C) (Tympanic)   Resp 16   Ht 5' 6" (1.676 m)   Wt 72.7 kg (160 lb 3.2 oz)   LMP  (LMP Unknown)   SpO2 98%   BMI 25.86 kg/m²     Physical Exam  Constitutional:       General: She is not in acute distress. Appearance: Normal appearance. HENT:      Head: Normocephalic. Mouth/Throat:      Mouth: Mucous membranes are moist.   Eyes:      General: No scleral icterus. Conjunctiva/sclera: Conjunctivae normal.   Neck:      Vascular: No carotid bruit. Cardiovascular:      Rate and Rhythm: Normal rate and regular rhythm. Pulmonary:      Effort: Pulmonary effort is normal.      Breath sounds: Normal breath sounds. Abdominal:      Palpations: Abdomen is soft. Tenderness: There is no abdominal tenderness. Musculoskeletal:      Cervical back: Neck supple. Right lower leg: No edema. Left lower leg: No edema. Lymphadenopathy:      Cervical: No cervical adenopathy. Skin:     General: Skin is warm and dry. Neurological:      General: No focal deficit present. Mental Status: She is alert and oriented to person, place, and time. Psychiatric:         Mood and Affect: Mood normal.         Behavior: Behavior normal.         Thought Content:  Thought content normal.         Judgment: Judgment normal.       Gee Tejada DO  Answers for HPI/ROS submitted by the patient on 8/27/2023  How would you rate your overall health?: fair  Compared to last year, how is your physical health?: slightly worse  In general, how satisfied are you with your life?: dissatisfied  Compared to last year, how is your eyesight?: slightly worse  Compared to last year, how is your hearing?: same  Compared to last year, how is your emotional/mental health?: slightly worse  How often is anger a problem for you?: sometimes  How often do you feel unusually tired/fatigued?: often  In the past 7 days, how much pain have you experienced?: a lot  If you answered "some" or "a lot", please rate the severity of your pain on a scale of 1 to 10 (1 being the least severe pain and 10 being the most intense pain). : 5/10  In the past 6 months, have you lost or gained 10 pounds without trying?: Yes  Additional Comments: memory worse; some balance issues; both legs wake me up at night with tightness/cramping; still get nights sweats; bending down/getting up harder; daily fatigue; muscle weakness-pain all over; tinnitus in ears  One or more falls in the last year: No  In the past 6 months, have you accidentally leaked urine?: Yes  Additional Comments: if i sneeze hard, i could wet my pants  Do you have trouble with the stairs inside or outside your home?: Yes  Does your home have working smoke alarms?: Yes  Does your home have a carbon monoxide monitor?: No  Which safety hazards (if any) have you experienced in your home? Please select all that apply.: loose rugs on the floor  Additional Comments: having new carpet installed 8-31-23 due to current carpet bulging in areas  How would you describe your current diet?  Please select all that apply.: Regular, Low Carb, No Added Salt  Additional Comments: i try buy low carb bread  In addition to prescription medications, are you taking any over-the-counter supplements?: Yes  If yes, what supplements are you taking?: vit d 2000; women centrum recently  Can you manage your medications?: Yes  Additional comments : when in bad pain flare up, deciding what meds to take  can be stressful  Are you currently taking any opioid medications?: No  Can you walk and transfer into and out of your bed and chair?: Yes  Can you dress and groom yourself?: Yes  Can you bathe or shower yourself?: Yes  Can you feed yourself?: Yes  Can you do your laundry/ housekeeping?: Yes  Can you manage your money, pay your bills, and track your expenses?: Yes  Can you make your own meals?: Yes  Can you do your own shopping?: Yes  Additional Comments: my parents live with me. dad goes to food store every day and picks up needed food or med items. If I'm not feeling well, parents can help with laundry, meal making, and other chores as needed.   If I'm not feeling well, I stay in pj's and rest  Please list your DME (Durable Medical Equipment) supplier, if you use one.: [employer] - used for shoe orthotics  Within the last 12 months, have you had any hospitalizations or Emergency Department visits?: No  Additional Comments: lorazepam helps with daily anxiety  Do you have a living will?: Yes  Do you have a Durable POA ([place]) for healthcare decisions?: Yes  Do you have an Advanced Directive for end of life decisions?: Yes  How often have you used an illegal drug (including marijuana) or a prescription medication for non-medical reasons in the past year?: never  What is the typical number of drinks you consume in a day?: 0  What is the typical number of drinks you consume in a week?: 0  How often did you have a drink containing alcohol in the past year?: never  How many drinks did you have on a typical day  when you were drinking in the past year?: 0  How often did you have 6 or more drinks on one occasion in the past year?: never
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
## 2023-10-24 ENCOUNTER — HOSPITAL ENCOUNTER (OUTPATIENT)
Dept: MAMMOGRAPHY | Facility: MEDICAL CENTER | Age: 63
Discharge: HOME | End: 2023-10-24
Payer: MEDICARE

## 2023-10-24 VITALS — WEIGHT: 160 LBS | BODY MASS INDEX: 25.71 KG/M2 | HEIGHT: 66 IN

## 2023-10-24 DIAGNOSIS — Z12.31 SCREENING MAMMOGRAM, ENCOUNTER FOR: ICD-10-CM

## 2023-10-24 PROCEDURE — 77063 BREAST TOMOSYNTHESIS BI: CPT

## 2023-10-24 PROCEDURE — 77067 SCR MAMMO BI INCL CAD: CPT

## 2023-11-30 ENCOUNTER — HOSPITAL ENCOUNTER (OUTPATIENT)
Dept: BONE DENSITY | Facility: MEDICAL CENTER | Age: 63
Discharge: HOME | End: 2023-11-30
Payer: MEDICARE

## 2023-11-30 DIAGNOSIS — Z78.0 MENOPAUSE: ICD-10-CM

## 2023-11-30 PROCEDURE — 77080 DXA BONE DENSITY AXIAL: CPT

## 2023-12-01 PROBLEM — M85.89 OSTEOPENIA OF MULTIPLE SITES: Status: ACTIVE | Noted: 2023-12-01

## 2024-01-25 ENCOUNTER — TELEPHONE (OUTPATIENT)
Age: 64
End: 2024-01-25

## 2024-01-25 ENCOUNTER — OFFICE VISIT (OUTPATIENT)
Dept: FAMILY MEDICINE CLINIC | Facility: CLINIC | Age: 64
End: 2024-01-25
Payer: COMMERCIAL

## 2024-01-25 VITALS
HEART RATE: 84 BPM | SYSTOLIC BLOOD PRESSURE: 110 MMHG | OXYGEN SATURATION: 100 % | TEMPERATURE: 96.6 F | DIASTOLIC BLOOD PRESSURE: 82 MMHG | WEIGHT: 164.6 LBS | HEIGHT: 66 IN | BODY MASS INDEX: 26.45 KG/M2 | RESPIRATION RATE: 16 BRPM

## 2024-01-25 DIAGNOSIS — M25.561 ACUTE PAIN OF RIGHT KNEE: ICD-10-CM

## 2024-01-25 DIAGNOSIS — M79.7 FIBROMYALGIA: ICD-10-CM

## 2024-01-25 DIAGNOSIS — M54.50 ACUTE BILATERAL LOW BACK PAIN WITHOUT SCIATICA: ICD-10-CM
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
## 2024-01-25 DIAGNOSIS — M54.2 NECK PAIN: ICD-10-CM

## 2024-01-25 PROCEDURE — 99214 OFFICE O/P EST MOD 30 MIN: CPT | Performed by: FAMILY MEDICINE
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
## 2024-01-29 ENCOUNTER — OFFICE VISIT (OUTPATIENT)
Dept: OBGYN CLINIC | Facility: MEDICAL CENTER | Age: 64
End: 2024-01-29
Payer: COMMERCIAL

## 2024-01-29 VITALS
WEIGHT: 164 LBS | HEART RATE: 72 BPM | HEIGHT: 66 IN | BODY MASS INDEX: 26.36 KG/M2 | SYSTOLIC BLOOD PRESSURE: 111 MMHG | DIASTOLIC BLOOD PRESSURE: 73 MMHG

## 2024-01-29 DIAGNOSIS — M54.50 ACUTE BILATERAL LOW BACK PAIN WITHOUT SCIATICA: ICD-10-CM

## 2024-01-29 DIAGNOSIS — M25.561 ACUTE PAIN OF RIGHT KNEE: ICD-10-CM

## 2024-01-29 DIAGNOSIS — M54.2 NECK PAIN: ICD-10-CM

## 2024-01-29 DIAGNOSIS — M17.11 OSTEOARTHRITIS OF RIGHT PATELLOFEMORAL JOINT: Primary | ICD-10-CM

## 2024-01-29 PROCEDURE — 99203 OFFICE O/P NEW LOW 30 MIN: CPT | Performed by: ORTHOPAEDIC SURGERY
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
## 2024-02-06 ENCOUNTER — TELEPHONE (OUTPATIENT)
Dept: FAMILY MEDICINE CLINIC | Facility: CLINIC | Age: 64
End: 2024-02-06

## 2024-03-19 ENCOUNTER — TELEPHONE (OUTPATIENT)
Dept: FAMILY MEDICINE CLINIC | Facility: CLINIC | Age: 64
End: 2024-03-19

## 2024-03-29 ENCOUNTER — OFFICE VISIT (OUTPATIENT)
Dept: FAMILY MEDICINE CLINIC | Facility: CLINIC | Age: 64
End: 2024-03-29
Payer: COMMERCIAL

## 2024-03-29 VITALS — BODY MASS INDEX: 26.03 KG/M2 | WEIGHT: 162 LBS | HEIGHT: 66 IN

## 2024-03-29 DIAGNOSIS — E04.1 THYROID NODULE: ICD-10-CM

## 2024-03-29 DIAGNOSIS — J01.00 ACUTE MAXILLARY SINUSITIS, RECURRENCE NOT SPECIFIED: ICD-10-CM

## 2024-03-29 DIAGNOSIS — K76.89 HEPATIC CYST: ICD-10-CM

## 2024-03-29 DIAGNOSIS — J02.9 SORE THROAT: Primary | ICD-10-CM

## 2024-03-29 LAB
S PYO AG THROAT QL: NEGATIVE
SARS-COV-2 AG UPPER RESP QL IA: NEGATIVE
SL AMB POCT RAPID FLU A: NEGATIVE
SL AMB POCT RAPID FLU B: NORMAL
VALID CONTROL: NORMAL

## 2024-03-29 PROCEDURE — 99214 OFFICE O/P EST MOD 30 MIN: CPT | Performed by: FAMILY MEDICINE

## 2024-03-29 PROCEDURE — 87804 INFLUENZA ASSAY W/OPTIC: CPT | Performed by: FAMILY MEDICINE

## 2024-03-29 PROCEDURE — 87880 STREP A ASSAY W/OPTIC: CPT | Performed by: FAMILY MEDICINE

## 2024-03-29 PROCEDURE — 87811 SARS-COV-2 COVID19 W/OPTIC: CPT | Performed by: FAMILY MEDICINE

## 2024-03-29 RX ORDER — GUAIFENESIN 600 MG/1
1200 TABLET, EXTENDED RELEASE ORAL EVERY 12 HOURS SCHEDULED
Qty: 30 TABLET | Refills: 0 | Status: SHIPPED | OUTPATIENT
Start: 2024-03-29

## 2024-03-29 RX ORDER — FLUTICASONE PROPIONATE 50 MCG
2 SPRAY, SUSPENSION (ML) NASAL DAILY
Qty: 9.9 ML | Refills: 0 | Status: SHIPPED | OUTPATIENT
Start: 2024-03-29

## 2024-03-29 NOTE — PROGRESS NOTES
Family Medicine Follow-Up Office Visit  Marjan Ramos 63 y.o. female   MRN: [id number] : 1960  ENCOUNTER: 3/29/2024       Assessment and Plan   1. Sore throat  Assessment & Plan:  Patient with signs and symptoms of sore throat likely consistent with viral URI.    COVID, Flu, and strep testing are negative at today's visit.    Patient is advised on options for symptomatic management including Flonase and Guaifenesin.     Recommend continued conservative management including increased rest and hydration.     Follow up in 1-2 weeks or sooner as needed should symptoms persist or worsen      Orders:  -     POCT rapid flu A and B  -     POCT Rapid Covid Ag  -     POCT rapid ANTIGEN strepA    2. Acute maxillary sinusitis, recurrence not specified  -     fluticasone (FLONASE) 50 mcg/act nasal spray; 2 sprays into each nostril daily  -     guaiFENesin (MUCINEX) 600 mg 12 hr tablet; Take 2 tablets (1,200 mg total) by mouth every 12 (twelve) hours    3. Thyroid nodule  Assessment & Plan:  Incidental finding of thyroid nodule noted on CT scan studies 24, recommend follow up thyroid ultrasound for further evaluation given family history of thyroid cancer.     Orders:  -     US thyroid; Future; Expected date: 2024    4. Hepatic cyst  Assessment & Plan:  Incidental finding of hepatic cyst noted on CT examination on 24.    Recommendation for RUQ ultrasound for follow up evaluation of this.     Orders:  -     US right upper quadrant; Future; Expected date: 2024             Chief Complaint     Chief Complaint   Patient presents with   • Cough     Sore throat- 3 days ago- Chest Pain       History of Present Illness   Marjan Ramos is a 63 y.o.-year-old female with past medical history of MDD, ANASTACIO, fibromyalgia, hyperlipidemia who presents today for evaluation of cough and sore throat.    Patient notes that she started about 3 days ago with sore throat with postnasal drip and sensation of dryness. She  "denies associated fevers, chills, or ear pain. Denies associated sinus pressure. She does note rhinorrhea and congestion with associated sneezing and musculoskeletal chest discomfort.     Review of Systems   Review of Systems   Constitutional:  Negative for fever.   HENT:  Positive for postnasal drip, rhinorrhea and sore throat. Negative for sinus pressure.    Respiratory:  Positive for cough.        Active Problem List     Patient Active Problem List   Diagnosis   • Anxiety state   • Depression   • Fibromyalgia   • Major depressive disorder, recurrent, in partial remission (HCC)   • Generalized anxiety disorder   • Vitamin deficiency   • Vitamin D deficiency   • Degeneration of lumbosacral intervertebral disc   • Fatigue   • Displacement of lumbar intervertebral disc without myelopathy   • Herniation of lumbar intervertebral disc with radiculopathy   • Lumbar canal stenosis   • Lumbar radiculopathy   • Hyperlipidemia   • Major depressive disorder, recurrent, moderate (HCC)   • Panic disorder without agoraphobia   • Rhinitis   • Temporomandibular dysfunction syndrome   • Osteopenia of multiple sites   • Sore throat   • Hepatic cyst   • Thyroid nodule       Past Medical History, Past Surgical History, Family History, and Social History were reviewed and updated today as appropriate.    Objective   Ht 5' 6\" (1.676 m)   Wt 73.5 kg (162 lb)   LMP  (LMP Unknown)   BMI 26.15 kg/m²     Physical Exam  Vitals reviewed.   Constitutional:       General: She is not in acute distress.     Appearance: She is well-developed. She is not ill-appearing.   HENT:      Head: Normocephalic and atraumatic.      Right Ear: Tympanic membrane and external ear normal.      Left Ear: Tympanic membrane and external ear normal.      Nose: Nose normal.      Mouth/Throat:      Mouth: Mucous membranes are moist.      Pharynx: Oropharynx is clear.   Eyes:      General: No scleral icterus.     Conjunctiva/sclera: Conjunctivae normal. " "  Cardiovascular:      Rate and Rhythm: Normal rate and regular rhythm.      Heart sounds: Normal heart sounds.   Pulmonary:      Effort: Pulmonary effort is normal. No respiratory distress.      Breath sounds: Normal breath sounds. No wheezing.   Abdominal:      General: Bowel sounds are normal. There is no distension.      Palpations: Abdomen is soft.      Tenderness: There is no abdominal tenderness.   Musculoskeletal:      Right lower leg: No edema.      Left lower leg: No edema.   Skin:     General: Skin is warm and dry.   Neurological:      General: No focal deficit present.      Mental Status: She is alert and oriented to person, place, and time.   Psychiatric:         Mood and Affect: Mood normal.         Behavior: Behavior normal.         Pertinent Laboratory/Diagnostic Studies:  Lab Results   Component Value Date    GLUCOSE 113 11/11/2014    BUN 18 09/14/2023    CREATININE 0.71 09/14/2023    CALCIUM 8.3 (L) 09/14/2023     11/11/2014    K 3.8 09/14/2023    CO2 30 09/14/2023     09/14/2023     Lab Results   Component Value Date    ALT 15 09/14/2023    AST 17 09/14/2023    ALKPHOS 28 (L) 09/14/2023    BILITOT 0.19 (L) 11/11/2014       Lab Results   Component Value Date    WBC 6.10 03/17/2021    HGB 12.9 03/17/2021    HCT 39.8 03/17/2021    MCV 92 03/17/2021     03/17/2021       Lab Results   Component Value Date    TSH 2.66 09/14/2023       No results found for: \"CHOL\"  No results found for: \"TRIG\"  No results found for: \"HDL\"  No results found for: \"LDLCALC\"  Lab Results   Component Value Date    HGBA1C 5.7 (H) 07/26/2019       Results for orders placed or performed in visit on 03/29/24   POCT rapid flu A and B   Result Value Ref Range    RAPID FLU A Negative     RAPID FLU B Negatvie    POCT Rapid Covid Ag   Result Value Ref Range    POCT SARS-CoV-2 Ag Negative Negative    VALID CONTROL Valid    POCT rapid ANTIGEN strepA   Result Value Ref Range     RAPID STREP A Negative Negative "       Orders Placed This Encounter   Procedures   • US right upper quadrant   • US thyroid   • POCT rapid flu A and B   • POCT Rapid Covid Ag   • POCT rapid ANTIGEN strepA         Current Medications     Current Outpatient Medications   Medication Sig Dispense Refill   • acetaminophen (TYLENOL) 325 mg tablet Take 650 mg by mouth every 6 (six) hours as needed for mild pain     • amitriptyline (ELAVIL) 10 mg tablet Take 10 mg by mouth daily at bedtime      • azelaic acid (AZELEX) 20 % cream Apply topically 2 (two) times a day     • Azelaic Acid (Finacea) 15 % cream Apply topically 2 (two) times a day       • Calcium Citrate (CITRACAL PO) Take by mouth 400 mg take three tab. Daily . Per Pt     • Cholecalciferol 5000 units capsule Take 2,000 Units by mouth daily     • citalopram (CeleXA) 20 mg tablet Take 30 mg by mouth daily   1   • ferrous sulfate 325 (65 Fe) mg tablet Take 1 tablet by mouth daily     • fluticasone (FLONASE) 50 mcg/act nasal spray 2 sprays into each nostril daily 9.9 mL 0   • guaiFENesin (MUCINEX) 600 mg 12 hr tablet Take 2 tablets (1,200 mg total) by mouth every 12 (twelve) hours 30 tablet 0   • Lactobacillus Rhamnosus, GG, (CULTURELLE PO) Take 1 capsule by mouth Daily     • LORazepam (ATIVAN) 0.5 mg tablet      • Magnesium 400 MG CAPS      • methocarbamol (ROBAXIN) 750 mg tablet methocarbamol 750 mg tablet   TAKE 1 TABLET BY MOUTH TWICE A DAY**NOT COVERED**     • Multiple Vitamin (MULTIVITAMIN ADULT PO) Take by mouth     • nabumetone (RELAFEN) 500 mg tablet TAKE 1 TABLET TWICE DAILY WITH FOOD. 30 tablet 2   • amoxicillin (AMOXIL) 500 mg capsule Take 1 capsule (500 mg total) by mouth every 8 (eight) hours for 7 days Take with food 21 capsule 0   • azithromycin (ZITHROMAX) 250 mg tablet Take 2 tablets today then 1 tablet daily x 4 days 6 tablet 0     No current facility-administered medications for this visit.       ALLERGIES:  Allergies   Allergen Reactions   • Clarithromycin Other (See Comments),  Rash and GI Intolerance   • Doxycycline Other (See Comments)   • Gabapentin    • Other      Annotation - [id number]: threw up blood after 1st nasal surgery; woke up during nasal surgery   • Pollen Extract    • Codeine Rash     Other reaction(s): Constipation  Reaction Date: 24Aug2011;          Health Maintenance     Health Maintenance   Topic Date Due   • Depression Follow-up Plan  Never done   • HIV Screening  Never done   • Zoster Vaccine (1 of 2) Never done   • Cervical Cancer Screening  07/09/2021   • COVID-19 Vaccine (4 - 2023-24 season) 09/01/2023   • Depression Screening  08/28/2024   • Medicare Annual Wellness Visit (AWV)  08/28/2024   • Breast Cancer Screening: Mammogram  10/24/2024   • Colorectal Cancer Screening  11/08/2024   • Hepatitis C Screening  Completed   • Influenza Vaccine  Completed   • Pneumococcal Vaccine: Pediatrics (0 to 5 Years) and At-Risk Patients (6 to 64 Years)  Aged Out   • HIB Vaccine  Aged Out   • IPV Vaccine  Aged Out   • Hepatitis A Vaccine  Aged Out   • Meningococcal ACWY Vaccine  Aged Out   • HPV Vaccine  Aged Out     Immunization History   Administered Date(s) Administered   • COVID-19 MODERNA VACC 0.5 ML IM 04/05/2021, 05/03/2021, 12/08/2021   • DT (pediatric) 10/25/2017   • INFLUENZA 10/22/2015, 10/20/2016, 10/12/2017, 10/17/2019, 09/15/2020, 10/05/2021, 10/18/2022, 10/13/2023   • Influenza Quadrivalent Preservative Free 3 years and older IM 11/11/2014   • Influenza Quadrivalent, 6-35 Months IM 10/22/2015, 10/20/2016, 10/12/2017   • Influenza, recombinant, quadrivalent,injectable, preservative free 09/24/2018, 10/17/2019, 09/15/2020, 10/05/2021, 10/18/2022   • Tdap 10/25/2017         Linsey Lawrence DO   [facility]  4/2/2024  9:45 AM    Parts of this note were dictated using Dragon dictation software and may have sounds-like errors due to variation in pronunciation.

## 2024-03-30 ENCOUNTER — NURSE TRIAGE (OUTPATIENT)
Dept: OTHER | Facility: OTHER | Age: 64
End: 2024-03-30

## 2024-03-30 ENCOUNTER — AMB VIDEO VISIT (OUTPATIENT)
Dept: FAMILY MEDICINE CLINIC | Facility: CLINIC | Age: 64
End: 2024-03-30
Payer: MEDICARE

## 2024-03-30 DIAGNOSIS — J01.90 ACUTE NON-RECURRENT SINUSITIS, UNSPECIFIED LOCATION: Primary | ICD-10-CM

## 2024-03-30 PROCEDURE — 99214 OFFICE O/P EST MOD 30 MIN: CPT | Performed by: FAMILY MEDICINE

## 2024-03-30 PROCEDURE — G2211 COMPLEX E/M VISIT ADD ON: HCPCS | Performed by: FAMILY MEDICINE

## 2024-03-30 RX ORDER — AMOXICILLIN 500 MG/1
500 CAPSULE ORAL EVERY 8 HOURS SCHEDULED
Qty: 21 CAPSULE | Refills: 0 | Status: SHIPPED | OUTPATIENT
Start: 2024-03-30 | End: 2024-04-06

## 2024-03-30 NOTE — TELEPHONE ENCOUNTER
"Regarding: Chills/ Mucous  ----- Message from Alyx Mejia sent at 3/30/2024 10:03 AM EDT -----  Pt stated, \" I saw the doctor yesterday,I thought it was allergies but I think I have a sinus infection. I had the chills all night, and would like something to break up the mucous.\"    "

## 2024-03-30 NOTE — TELEPHONE ENCOUNTER
"Reason for Disposition  • [1] Sinus congestion as part of a cold AND [2] present < 10 days    Answer Assessment - Initial Assessment Questions  1. LOCATION: \"Where does it hurt?\"       Bridge of nose; has mucous in nose and mouth    2. ONSET: \"When did the sinus pain start?\"  (e.g., hours, days)       Symptoms started late last night    3. SEVERITY: \"How bad is the pain?\"   (Scale 1-10; mild, moderate or severe)    - MILD (1-3): doesn't interfere with normal activities     - MODERATE (4-7): interferes with normal activities (e.g., work or school) or awakens from sleep    - SEVERE (8-10): excruciating pain and patient unable to do any normal activities         Worse this morning when awakening; took Mucinex - 2/10.    4. RECURRENT SYMPTOM: \"Have you ever had sinus problems before?\" If Yes, ask: \"When was the last time?\" and \"What happened that time?\"           5. NASAL CONGESTION: \"Is the nose blocked?\" If Yes, ask: \"Can you open it or must you breathe through the mouth?\"      On the right side    6. NASAL DISCHARGE: \"Do you have discharge from your nose?\" If so ask, \"What color?\"      Just \"sticky, gooey\" stuff; not green    7. FEVER: \"Do you have a fever?\" If Yes, ask: \"What is it, how was it measured, and when did it start?\"       Subjective fever; chills, feels achy    8. OTHER SYMPTOMS: \"Do you have any other symptoms?\" (e.g., sore throat, cough, earache, difficulty breathing)      Sore throat has gotten better; now just has postnasal drip      Saw office yesterday; no fever at that time. Patient reports chills starting last night; aching.  Taking 1200mg of Mucinex at recommended    Protocols used: Sinus Pain or Congestion-ADULT-AH    "

## 2024-03-30 NOTE — TELEPHONE ENCOUNTER
Per on-call provider, antibiotics not routinely prescribed over the weekend; if patient wants to do a virtual visit, provider can call her.
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
## 2024-03-31 ENCOUNTER — NURSE TRIAGE (OUTPATIENT)
Dept: OTHER | Facility: OTHER | Age: 64
End: 2024-03-31

## 2024-03-31 NOTE — TELEPHONE ENCOUNTER
"Regarding: Body Rash  ----- Message from Brittany Cherry sent at 3/31/2024 11:46 AM EDT -----  \" I woke up with a rash and I believe I'm having a reaction to a medication\"    "

## 2024-03-31 NOTE — TELEPHONE ENCOUNTER
"Reason for Disposition  • Taking new prescription antibiotic (Exception: finished taking new prescription antibiotic)    Answer Assessment - Initial Assessment Questions  1. APPEARANCE of RASH: \"Describe the rash.\" (e.g., spots, blisters, raised areas, skin peeling, scaly)      Rash is raised.     2. SIZE: \"How big are the spots?\" (e.g., tip of pen, eraser, coin; inches, centimeters)      Blotchy    3. LOCATION: \"Where is the rash located?\"      Chest, shoulders, on thigh    4. COLOR: \"What color is the rash?\" (Note: It is difficult to assess rash color in people with darker-colored skin. When this situation occurs, simply ask the caller to describe what they see.)      Red    5. ONSET: \"When did the rash begin?\"      Last night    6. FEVER: \"Do you have a fever?\" If Yes, ask: \"What is your temperature, how was it measured, and when did it start?\"      Temp: 97.5 (forehead).       7. ITCHING: \"Does the rash itch?\" If Yes, ask: \"How bad is the itch?\" (Scale 1-10; or mild, moderate, severe)      Not itchy.     8. CAUSE: \"What do you think is causing the rash?\"      Concerned about Amoxicillin    9. NEW MEDICATION: \"What new medication are you taking?\" (e.g., name of antibiotic) \"When did you start taking this medication?\".      Says this is the first time she has been on amoxicillin    10. OTHER SYMPTOMS: \"Do you have any other symptoms?\" (e.g., sore throat, fever, joint pain)        No issues with breathing. No facial swelling.    Protocols used: Rash - Widespread On Drugs-ADULT-AH    "

## 2024-04-01 ENCOUNTER — OFFICE VISIT (OUTPATIENT)
Dept: FAMILY MEDICINE CLINIC | Facility: CLINIC | Age: 64
End: 2024-04-01
Payer: MEDICARE

## 2024-04-01 ENCOUNTER — TELEPHONE (OUTPATIENT)
Age: 64
End: 2024-04-01

## 2024-04-01 VITALS
HEIGHT: 66 IN | WEIGHT: 159 LBS | DIASTOLIC BLOOD PRESSURE: 67 MMHG | OXYGEN SATURATION: 98 % | SYSTOLIC BLOOD PRESSURE: 110 MMHG | TEMPERATURE: 98 F | HEART RATE: 98 BPM | BODY MASS INDEX: 25.55 KG/M2

## 2024-04-01 DIAGNOSIS — F33.41 MAJOR DEPRESSIVE DISORDER, RECURRENT, IN PARTIAL REMISSION (HCC): ICD-10-CM

## 2024-04-01 DIAGNOSIS — F33.1 MAJOR DEPRESSIVE DISORDER, RECURRENT, MODERATE (HCC): ICD-10-CM

## 2024-04-01 DIAGNOSIS — Z11.4 SCREENING FOR HIV (HUMAN IMMUNODEFICIENCY VIRUS): ICD-10-CM

## 2024-04-01 DIAGNOSIS — L56.8 PHOTODERMATITIS: ICD-10-CM

## 2024-04-01 DIAGNOSIS — J40 BRONCHITIS: Primary | ICD-10-CM

## 2024-04-01 PROCEDURE — 99214 OFFICE O/P EST MOD 30 MIN: CPT | Performed by: FAMILY MEDICINE

## 2024-04-01 RX ORDER — AZITHROMYCIN 250 MG/1
TABLET, FILM COATED ORAL
Qty: 6 TABLET | Refills: 0 | Status: SHIPPED | OUTPATIENT
Start: 2024-04-01 | End: 2024-04-08

## 2024-04-01 NOTE — TELEPHONE ENCOUNTER
Pts pharmacy reported there was a interaction with the zpack,warmed transferred for clarification with doctor.

## 2024-04-01 NOTE — PROGRESS NOTES
"Assessment/Plan: Recommend azithromycin for bronchitis.  Hold amoxicillin.  Photodermatitis should improve.  If no improvement or worsening symptoms return to office.     1. Bronchitis  -     azithromycin (ZITHROMAX) 250 mg tablet; Take 2 tablets today then 1 tablet daily x 4 days    2. Screening for HIV (human immunodeficiency virus)  -     HIV 1/2 AG/AB w Reflex [facility] for 2 yr old and above; Future    3. Major depressive disorder, recurrent, in partial remission (HCC)    4. Major depressive disorder, recurrent, moderate (HCC)    5. Photodermatitis          Subjective:      Patient ID: Marjan Ramos is a 63 y.o. female.    Is seen with bronchitis symptoms.  She was put on amoxicillin this past weekend and developed a photodermatitis rash to the upper chest in sun exposed areas.    Rash  Associated symptoms include coughing.            The following portions of the patient's history were reviewed and updated as appropriate: allergies, current medications, past family history, past medical history, past social history, past surgical history, and problem list.    Review of Systems   Constitutional: Negative.    HENT: Negative.     Eyes: Negative.    Respiratory:  Positive for cough.    Cardiovascular: Negative.    Gastrointestinal: Negative.    Endocrine: Negative.    Genitourinary: Negative.    Musculoskeletal: Negative.    Skin:  Positive for rash.   Allergic/Immunologic: Negative.    Neurological: Negative.    Hematological: Negative.    Psychiatric/Behavioral: Negative.           Objective:      /67 (BP Location: Left arm, Patient Position: Sitting, Cuff Size: Standard)   Pulse 98   Temp 98 °F (36.7 °C)   Ht 5' 6\" (1.676 m)   Wt 72.1 kg (159 lb)   LMP  (LMP Unknown)   SpO2 98%   BMI 25.66 kg/m²          Physical Exam  Vitals reviewed.   Constitutional:       Appearance: She is well-developed.   HENT:      Head: Normocephalic and atraumatic.      Right Ear: External ear normal. Tympanic membrane is " not erythematous or bulging.      Left Ear: External ear normal. Tympanic membrane is not erythematous or bulging.      Nose: Nose normal.      Mouth/Throat:      Mouth: No oral lesions.      Pharynx: No oropharyngeal exudate.   Eyes:      General: No scleral icterus.        Right eye: No discharge.         Left eye: No discharge.      Conjunctiva/sclera: Conjunctivae normal.   Neck:      Thyroid: No thyromegaly.   Cardiovascular:      Rate and Rhythm: Normal rate and regular rhythm.      Heart sounds: Normal heart sounds. No murmur heard.     No friction rub. No gallop.   Pulmonary:      Effort: Pulmonary effort is normal. No respiratory distress.      Breath sounds: No wheezing or rales.   Chest:      Chest wall: No tenderness.   Abdominal:      General: Bowel sounds are normal. There is no distension.      Palpations: Abdomen is soft. There is no mass.      Tenderness: There is no abdominal tenderness. There is no guarding or rebound.   Musculoskeletal:         General: No tenderness or deformity. Normal range of motion.      Cervical back: Normal range of motion and neck supple.   Lymphadenopathy:      Cervical: No cervical adenopathy.   Skin:     General: Skin is warm and dry.      Coloration: Skin is not pale.      Findings: Rash (Macular rash to the upper chest along clothing line to sun exposed area.) present. No erythema.   Neurological:      Mental Status: She is alert and oriented to person, place, and time.      Cranial Nerves: No cranial nerve deficit.      Motor: No abnormal muscle tone.      Coordination: Coordination normal.      Deep Tendon Reflexes: Reflexes are normal and symmetric.   Psychiatric:         Behavior: Behavior normal.

## 2024-04-02 PROBLEM — J02.9 SORE THROAT: Status: ACTIVE | Noted: 2024-04-02

## 2024-04-02 PROBLEM — K76.89 HEPATIC CYST: Status: ACTIVE | Noted: 2024-04-02

## 2024-04-02 PROBLEM — E04.1 THYROID NODULE: Status: ACTIVE | Noted: 2024-04-02

## 2024-04-02 NOTE — ASSESSMENT & PLAN NOTE
Incidental finding of hepatic cyst noted on CT examination on 1/23/24.    Recommendation for RUQ ultrasound for follow up evaluation of this.

## 2024-04-02 NOTE — ASSESSMENT & PLAN NOTE
Incidental finding of thyroid nodule noted on CT scan studies 1/23/24, recommend follow up thyroid ultrasound for further evaluation given family history of thyroid cancer.

## 2024-04-02 NOTE — ASSESSMENT & PLAN NOTE
Patient with signs and symptoms of sore throat likely consistent with viral URI.    COVID, Flu, and strep testing are negative at today's visit.    Patient is advised on options for symptomatic management including Flonase and Guaifenesin.     Recommend continued conservative management including increased rest and hydration.     Follow up in 1-2 weeks or sooner as needed should symptoms persist or worsen

## 2024-04-03 ENCOUNTER — TELEPHONE (OUTPATIENT)
Age: 64
End: 2024-04-03

## 2024-04-03 NOTE — TELEPHONE ENCOUNTER
Pt called the office back to check if Dr. Membreno received her message. Pt is aware that someone from the office will contact her back as soon as the provider reviews. Pt verbally understands.

## 2024-04-03 NOTE — TELEPHONE ENCOUNTER
Pt called in stating that she wants to speak to Dr. Membreno to discuss on her current med and newly prescribed med will that effect her heart in any way she feels more comfortable discussing with doctor. Please return call to patient. Thanks

## 2024-04-06 DIAGNOSIS — J01.00 ACUTE MAXILLARY SINUSITIS, RECURRENCE NOT SPECIFIED: ICD-10-CM

## 2024-04-07 RX ORDER — FLUTICASONE PROPIONATE 50 MCG
SPRAY, SUSPENSION (ML) NASAL
Qty: 16 ML | OUTPATIENT
Start: 2024-04-07

## 2024-04-19 ENCOUNTER — TELEPHONE (OUTPATIENT)
Dept: FAMILY MEDICINE CLINIC | Facility: CLINIC | Age: 64
End: 2024-04-19

## 2024-05-20 ENCOUNTER — TELEPHONE (OUTPATIENT)
Dept: FAMILY MEDICINE CLINIC | Facility: CLINIC | Age: 64
End: 2024-05-20

## 2024-05-24 ENCOUNTER — HOSPITAL ENCOUNTER (OUTPATIENT)
Dept: ULTRASOUND IMAGING | Facility: MEDICAL CENTER | Age: 64
Discharge: HOME | End: 2024-05-24
Payer: MEDICARE

## 2024-05-24 ENCOUNTER — HOSPITAL ENCOUNTER (OUTPATIENT)
Dept: ULTRASOUND IMAGING | Facility: MEDICAL CENTER | Age: 64
End: 2024-05-24
Payer: MEDICARE

## 2024-05-24 DIAGNOSIS — E04.1 THYROID NODULE: ICD-10-CM

## 2024-05-24 DIAGNOSIS — K76.89 HEPATIC CYST: ICD-10-CM

## 2024-05-24 PROCEDURE — 76705 ECHO EXAM OF ABDOMEN: CPT

## 2024-05-24 PROCEDURE — 76536 US EXAM OF HEAD AND NECK: CPT

## 2024-06-11 ENCOUNTER — TELEPHONE (OUTPATIENT)
Dept: FAMILY MEDICINE CLINIC | Facility: CLINIC | Age: 64
End: 2024-06-11

## 2024-06-11 NOTE — TELEPHONE ENCOUNTER
Lmom to call office back ok to tell pt  note below        Ja Phillip,     I have reviewed your results from the ultrasound which shows a few liver cysts, the largest of which is 2.2 cm. There were no concerning findings or signs of cancer noted on the ultrasound. Given how small the cysts are - these are low risk and follow up imaging is not needed. Feel free to reach out with any additional questions or concerns.     Take care,  Dr. Lawrence   Written by Linsey Lawrence DO on 6/5/2024 12:56 PM EDT

## 2024-06-11 NOTE — TELEPHONE ENCOUNTER
Called patient to review Thyroid US results per Dr. Lawrence        I have reviewed your results which shows that you continue with a thyroid nodule. This nodule is small and so does not require biopsy, however we recommend repeating the thyroid ultrasound in about 1 year to ensure that the nodule does not change. Feel free to reach out if you have any additional questions or concerns.       Patient verbally understood and had no further questions.

## 2024-07-10 ENCOUNTER — TELEPHONE (OUTPATIENT)
Age: 64
End: 2024-07-10

## 2024-07-10 ENCOUNTER — OFFICE VISIT (OUTPATIENT)
Dept: FAMILY MEDICINE CLINIC | Facility: CLINIC | Age: 64
End: 2024-07-10
Payer: MEDICARE

## 2024-07-10 VITALS
DIASTOLIC BLOOD PRESSURE: 78 MMHG | HEIGHT: 66 IN | BODY MASS INDEX: 25.1 KG/M2 | OXYGEN SATURATION: 95 % | SYSTOLIC BLOOD PRESSURE: 130 MMHG | HEART RATE: 90 BPM | WEIGHT: 156.2 LBS | TEMPERATURE: 32.7 F

## 2024-07-10 DIAGNOSIS — K20.90 ESOPHAGITIS: Primary | ICD-10-CM

## 2024-07-10 PROCEDURE — G2211 COMPLEX E/M VISIT ADD ON: HCPCS | Performed by: FAMILY MEDICINE

## 2024-07-10 PROCEDURE — 99213 OFFICE O/P EST LOW 20 MIN: CPT | Performed by: FAMILY MEDICINE

## 2024-07-10 RX ORDER — OMEPRAZOLE 40 MG/1
40 CAPSULE, DELAYED RELEASE ORAL DAILY
Qty: 30 CAPSULE | Refills: 0 | Status: SHIPPED | OUTPATIENT
Start: 2024-07-10

## 2024-07-10 RX ORDER — SUCRALFATE ORAL 1 G/10ML
1 SUSPENSION ORAL 4 TIMES DAILY PRN
Qty: 414 ML | Refills: 0 | Status: SHIPPED | OUTPATIENT
Start: 2024-07-10

## 2024-07-10 NOTE — PROGRESS NOTES
Ambulatory Visit  Name: Marjan Ramos      : 1960      MRN: [id number]  Encounter Provider: Linsey Lawrence DO  Encounter Date: 7/10/2024   Encounter department: [facility]    Assessment & Plan   1. Esophagitis  Assessment & Plan:  Patient with signs and symptoms consistent with esophagitis following vomiting episode likely related to viral gastroenteritis.  Recommendation for symptomatic management with Prilosec 40 mg daily for 2 weeks and as needed following this.  For acute management of symptoms recommend Carafate 4 times a day with meals as needed for reflux.    Follow up in 1-2 weeks or sooner as needed should symptoms persist or worsen  Orders:  -     omeprazole (PriLOSEC) 40 MG capsule; Take 1 capsule (40 mg total) by mouth daily  -     sucralfate (CARAFATE) 1 g/10 mL suspension; Take 10 mL (1 g total) by mouth 4 (four) times a day as needed (reflux)     History of Present Illness     Marjan is a 64-year-old female with past medical history of MDD, ANASTACIO, fibromyalgia, hyperlipidemia who presents today for evaluation regarding symptoms of vomiting and losing her voice.    Patient notes that early Tuesday morning at 2 to 2:30 AM she woke up from sleep and had episode of violently vomiting up liquid.  She denies solid contents in the vomitus.  She notes burning sensation in her throat.  She notes watery diarrhea following this.  Notes that she has lost her voice for a day or 2.  Notes that symptoms of sore throat have persisted despite improvement in nausea and diarrhea.    Vomiting   Pertinent negatives include no abdominal pain, arthralgias, chest pain, coughing, diarrhea, dizziness, fever, headaches or myalgias.       Review of Systems   Constitutional:  Negative for fatigue and fever.   HENT:  Positive for sore throat. Negative for congestion.    Respiratory:  Negative for cough and shortness of breath.    Cardiovascular:  Negative for chest pain and palpitations.  "  Gastrointestinal:  Positive for vomiting. Negative for abdominal pain, blood in stool, constipation, diarrhea and nausea.   Genitourinary:  Negative for dysuria and hematuria.   Musculoskeletal:  Negative for arthralgias and myalgias.   Skin:  Negative for rash.   Neurological:  Negative for dizziness and headaches.   Psychiatric/Behavioral:  Negative for dysphoric mood. The patient is not nervous/anxious.        Objective     /78 (BP Location: Right arm)   Pulse 90   Temp (!) 32.7 °F (0.4 °C) (Tympanic)   Ht 5' 6\" (1.676 m)   Wt 70.9 kg (156 lb 3.2 oz)   LMP  (LMP Unknown)   SpO2 95%   BMI 25.21 kg/m²     Physical Exam  Vitals reviewed.   Constitutional:       General: She is not in acute distress.     Appearance: She is well-developed.   HENT:      Head: Normocephalic and atraumatic.      Right Ear: External ear normal.      Left Ear: External ear normal.      Mouth/Throat:      Mouth: Mucous membranes are moist.      Pharynx: Oropharynx is clear. Posterior oropharyngeal erythema present.   Eyes:      Conjunctiva/sclera: Conjunctivae normal.   Cardiovascular:      Rate and Rhythm: Normal rate and regular rhythm.      Heart sounds: No murmur heard.  Pulmonary:      Effort: Pulmonary effort is normal. No respiratory distress.      Breath sounds: Normal breath sounds.   Abdominal:      General: Bowel sounds are normal.      Palpations: Abdomen is soft.      Tenderness: There is no abdominal tenderness.   Musculoskeletal:      Right lower leg: No edema.      Left lower leg: No edema.   Skin:     General: Skin is warm and dry.   Neurological:      General: No focal deficit present.      Mental Status: She is alert and oriented to person, place, and time.   Psychiatric:         Mood and Affect: Mood normal.         Behavior: Behavior normal.       Administrative Statements     "

## 2024-07-10 NOTE — TELEPHONE ENCOUNTER
Reason for call:   [x] Prior Auth  [] Other:     Caller:  [] Patient  [x] Pharmacy  Name: [facility]  Address:[address], [place]    Callback Number: [phone]     Medication: sucralfate (CARAFATE) 1 g/10 mL suspension     Dose/Frequency: Take 10 mL (1 g total) by mouth 4 (four) times a day     Quantity: 414 mL    Ordering Provider:   [x] PCP/Provider - Dr. Melinda DO   [] Speciality/Provider -

## 2024-07-12 ENCOUNTER — TELEPHONE (OUTPATIENT)
Age: 64
End: 2024-07-12

## 2024-07-12 NOTE — TELEPHONE ENCOUNTER
Patient called to schedule a sooner appt with us. Only want to schedule in [place], offered first available in [place] but refused. Patient stated she will call around for a sooner date.

## 2024-07-15 PROBLEM — K20.90 ESOPHAGITIS: Status: ACTIVE | Noted: 2024-07-15

## 2024-07-16 NOTE — ASSESSMENT & PLAN NOTE
Patient with signs and symptoms consistent with esophagitis following vomiting episode likely related to viral gastroenteritis.  Recommendation for symptomatic management with Prilosec 40 mg daily for 2 weeks and as needed following this.  For acute management of symptoms recommend Carafate 4 times a day with meals as needed for reflux.    Follow up in 1-2 weeks or sooner as needed should symptoms persist or worsen

## 2024-07-22 ENCOUNTER — TELEPHONE (OUTPATIENT)
Age: 64
End: 2024-07-22

## 2024-07-22 NOTE — TELEPHONE ENCOUNTER
Pt had missed call from office. Checked chart for any notes left. No notes, message left was in regards to pt's mother.

## 2024-07-29 NOTE — TELEPHONE ENCOUNTER
PA for sucralfate (CARAFATE) 1 g/10 mL suspension Approved     Date(s) approved January 1, 2024 to July 29, 2025     Case #    Patient advised by          [x] MyChart Message  [] Phone call   []LMOM  []L/M to call office as no active Communication consent on file  []Unable to leave detailed message as VM not approved on Communication consent       Pharmacy advised by    [x]Fax  []Phone call    Approval letter scanned into Media Yes

## 2024-07-29 NOTE — TELEPHONE ENCOUNTER
PA for sucralfate (CARAFATE) 1 g/10 mL suspension     Submitted via    [][employer]-KEY   [x][employer]-Case ID # [id number]   []Faxed to plan   []Other website   []Phone call Case ID #     Office notes sent, clinical questions answered. Awaiting determination    Turnaround time for your insurance to make a decision on your Prior Authorization can take 7-21 business days.

## 2024-08-01 DIAGNOSIS — K20.90 ESOPHAGITIS: ICD-10-CM

## 2024-08-02 RX ORDER — OMEPRAZOLE 40 MG/1
40 CAPSULE, DELAYED RELEASE ORAL DAILY
Qty: 90 CAPSULE | Refills: 1 | Status: SHIPPED | OUTPATIENT
Start: 2024-08-02

## 2024-08-02 NOTE — TELEPHONE ENCOUNTER
Changes Requested     Name from pharmacy: OMEPRAZOLE DR 40 MG CAPSULE         Will file in chart as: omeprazole (PriLOSEC) 40 MG capsule    Sig: Take 1 capsule (40 mg total) by mouth daily    Original sig: TAKE 1 CAPSULE (40 MG TOTAL) BY MOUTH DAILY.    Disp: 90 capsule    Refills: 1    Start: 8/1/2024    Class: Normal    Non-formulary For: Esophagitis    Last ordered: 3 weeks ago (7/10/2024) by Linsey Lawrence DO    Last refill: 7/10/2024    Rx #: [id number]    Pharmacy comment: REQUEST FOR 90 DAYS PRESCRIPTION. DX Code Needed.    Gastroenterology: Proton Pump Inhibitors [id number]/01/2024 12:31 PM   Protocol Details Valid encounter within last 12 months    Patient is over 3 years old.      This request has changes from the previous prescription.   To be filled at: [facility]/pharmacy #[id number] - [place] - [address]

## 2024-08-07 ENCOUNTER — TELEPHONE (OUTPATIENT)
Age: 64
End: 2024-08-07

## 2024-08-07 NOTE — TELEPHONE ENCOUNTER
Patient called in regards to believing that the Omeprazole Is making her sugar levels go up. Patient stated that on July 14 her sugar level was 146. Patient stated that she stopped taking the Omeprazole on Monday and when she checked her sugar levels this morning at 9:33 her sugar levels was 138 before eating anything. Patient stated she read online and believes that it is the omeprazole that is causing her sugar levels to go up even though she stopped taking medication on Monday.

## 2024-08-09 NOTE — TELEPHONE ENCOUNTER
Called pt, unable to speak with pt clear, phone was breaking up and the pt was having a hard time due to couldn't hear me. Pt states will call office back.

## 2024-08-14 ENCOUNTER — RA CDI HCC (OUTPATIENT)
Dept: OTHER | Facility: HOSPITAL | Age: 64
End: 2024-08-14

## 2024-08-21 RX ORDER — SODIUM CHLORIDE 9 MG/ML
125 INJECTION, SOLUTION INTRAVENOUS CONTINUOUS
OUTPATIENT
Start: 2024-08-21

## 2024-09-19 ENCOUNTER — OFFICE VISIT (OUTPATIENT)
Dept: FAMILY MEDICINE CLINIC | Facility: CLINIC | Age: 64
End: 2024-09-19
Payer: MEDICARE

## 2024-09-19 VITALS
SYSTOLIC BLOOD PRESSURE: 100 MMHG | WEIGHT: 148.6 LBS | OXYGEN SATURATION: 100 % | DIASTOLIC BLOOD PRESSURE: 64 MMHG | HEIGHT: 66 IN | RESPIRATION RATE: 16 BRPM | TEMPERATURE: 97.4 F | BODY MASS INDEX: 23.88 KG/M2 | HEART RATE: 72 BPM

## 2024-09-19 DIAGNOSIS — E04.1 THYROID NODULE: ICD-10-CM

## 2024-09-19 DIAGNOSIS — F32.A DEPRESSION, UNSPECIFIED DEPRESSION TYPE: ICD-10-CM

## 2024-09-19 DIAGNOSIS — E78.5 HYPERLIPIDEMIA, UNSPECIFIED HYPERLIPIDEMIA TYPE: ICD-10-CM

## 2024-09-19 DIAGNOSIS — E55.9 VITAMIN D DEFICIENCY: ICD-10-CM

## 2024-09-19 DIAGNOSIS — R73.9 HYPERGLYCEMIA: ICD-10-CM

## 2024-09-19 DIAGNOSIS — Z00.00 MEDICARE ANNUAL WELLNESS VISIT, SUBSEQUENT: Primary | ICD-10-CM

## 2024-09-19 DIAGNOSIS — Z12.31 SCREENING MAMMOGRAM, ENCOUNTER FOR: ICD-10-CM

## 2024-09-19 DIAGNOSIS — F41.1 GENERALIZED ANXIETY DISORDER: ICD-10-CM

## 2024-09-19 DIAGNOSIS — M79.7 FIBROMYALGIA: ICD-10-CM

## 2024-09-19 DIAGNOSIS — M85.89 OSTEOPENIA OF MULTIPLE SITES: ICD-10-CM

## 2024-09-19 PROCEDURE — 99214 OFFICE O/P EST MOD 30 MIN: CPT | Performed by: FAMILY MEDICINE

## 2024-09-19 PROCEDURE — G0439 PPPS, SUBSEQ VISIT: HCPCS | Performed by: FAMILY MEDICINE

## 2024-09-19 NOTE — PATIENT INSTRUCTIONS
Medicare Preventive Visit Patient Instructions  Thank you for completing your Welcome to Medicare Visit or Medicare Annual Wellness Visit today. Your next wellness visit will be due in one year (9/20/2025).  The screening/preventive services that you may require over the next 5-10 years are detailed below. Some tests may not apply to you based off risk factors and/or age. Screening tests ordered at today's visit but not completed yet may show as past due. Also, please note that scanned in results may not display below.  Preventive Screenings:  Service Recommendations Previous Testing/Comments   Colorectal Cancer Screening  * Colonoscopy    * Fecal Occult Blood Test (FOBT)/Fecal Immunochemical Test (FIT)  * Fecal DNA/Cologuard Test  * Flexible Sigmoidoscopy Age: 45-75 years old   Colonoscopy: every 10 years (may be performed more frequently if at higher risk)  OR  FOBT/FIT: every 1 year  OR  Cologuard: every 3 years  OR  Sigmoidoscopy: every 5 years  Screening may be recommended earlier than age 45 if at higher risk for colorectal cancer. Also, an individualized decision between you and your healthcare provider will decide whether screening between the ages of 76-85 would be appropriate. Colonoscopy: 11/08/2019  FOBT/FIT: Not on file  Cologuard: Not on file  Sigmoidoscopy: Not on file    Screening Current     Breast Cancer Screening Age: 40+ years old  Frequency: every 1-2 years  Not required if history of left and right mastectomy Mammogram: 10/24/2023    Screening Current   Cervical Cancer Screening Between the ages of 21-29, pap smear recommended once every 3 years.   Between the ages of 30-65, can perform pap smear with HPV co-testing every 5 years.   Recommendations may differ for women with a history of total hysterectomy, cervical cancer, or abnormal pap smears in past. Pap Smear: 07/09/2018        Hepatitis C Screening Once for adults born between 1945 and 1965  More frequently in patients at high risk for  Hepatitis C Hep C Antibody: 10/12/2017    Screening Current   Diabetes Screening 1-2 times per year if you're at risk for diabetes or have pre-diabetes Fasting glucose: No results in last 5 years (No results in last 5 years)  A1C: No results in last 5 years (No results in last 5 years)      Cholesterol Screening Once every 5 years if you don't have a lipid disorder. May order more often based on risk factors. Lipid panel: 09/14/2023    Screening Not Indicated  History Lipid Disorder     Other Preventive Screenings Covered by Medicare:  Abdominal Aortic Aneurysm (AAA) Screening: covered once if your at risk. You're considered to be at risk if you have a family history of AAA.  Lung Cancer Screening: covers low dose CT scan once per year if you meet all of the following conditions: (1) Age 55-77; (2) No signs or symptoms of lung cancer; (3) Current smoker or have quit smoking within the last 15 years; (4) You have a tobacco smoking history of at least 20 pack years (packs per day multiplied by number of years you smoked); (5) You get a written order from a healthcare provider.  Glaucoma Screening: covered annually if you're considered high risk: (1) You have diabetes OR (2) Family history of glaucoma OR (3)  aged 50 and older OR (4)  American aged 65 and older  Osteoporosis Screening: covered every 2 years if you meet one of the following conditions: (1) You're estrogen deficient and at risk for osteoporosis based off medical history and other findings; (2) Have a vertebral abnormality; (3) On glucocorticoid therapy for more than 3 months; (4) Have primary hyperparathyroidism; (5) On osteoporosis medications and need to assess response to drug therapy.   Last bone density test (DXA Scan): 12/01/2023.  HIV Screening: covered annually if you're between the age of 15-65. Also covered annually if you are younger than 15 and older than 65 with risk factors for HIV infection. For pregnant patients,  it is covered up to 3 times per pregnancy.    Immunizations:  Immunization Recommendations   Influenza Vaccine Annual influenza vaccination during flu season is recommended for all persons aged >= 6 months who do not have contraindications   Pneumococcal Vaccine   * Pneumococcal conjugate vaccine = PCV13 (Prevnar 13), PCV15 (Vaxneuvance), PCV20 (Prevnar 20)  * Pneumococcal polysaccharide vaccine = PPSV23 (Pneumovax) Adults 19-63 yo with certain risk factors or if 65+ yo  If never received any pneumonia vaccine: recommend Prevnar 20 (PCV20)  Give PCV20 if previously received 1 dose of PCV13 or PPSV23   Hepatitis B Vaccine 3 dose series if at intermediate or high risk (ex: diabetes, end stage renal disease, liver disease)   Respiratory syncytial virus (RSV) Vaccine - COVERED BY MEDICARE PART D  * RSVPreF3 (Arexvy) CDC recommends that adults 60 years of age and older may receive a single dose of RSV vaccine using shared clinical decision-making (SCDM)   Tetanus (Td) Vaccine - COST NOT COVERED BY MEDICARE PART B Following completion of primary series, a booster dose should be given every 10 years to maintain immunity against tetanus. Td may also be given as tetanus wound prophylaxis.   Tdap Vaccine - COST NOT COVERED BY MEDICARE PART B Recommended at least once for all adults. For pregnant patients, recommended with each pregnancy.   Shingles Vaccine (Shingrix) - COST NOT COVERED BY MEDICARE PART B  2 shot series recommended in those 19 years and older who have or will have weakened immune systems or those 50 years and older     Health Maintenance Due:      Topic Date Due   • HIV Screening  Never done   • Cervical Cancer Screening  07/09/2023   • Breast Cancer Screening: Mammogram  10/24/2024   • Colorectal Cancer Screening  11/08/2024   • Hepatitis C Screening  Completed     Immunizations Due:      Topic Date Due   • COVID-19 Vaccine (4 - 2023-24 season) 09/01/2024   • Influenza Vaccine (1) 09/01/2024     Advance  Directives   What are advance directives?  Advance directives are legal documents that state your wishes and plans for medical care. These plans are made ahead of time in case you lose your ability to make decisions for yourself. Advance directives can apply to any medical decision, such as the treatments you want, and if you want to donate organs.   What are the types of advance directives?  There are many types of advance directives, and each state has rules about how to use them. You may choose a combination of any of the following:  Living will:  This is a written record of the treatment you want. You can also choose which treatments you do not want, which to limit, and which to stop at a certain time. This includes surgery, medicine, IV fluid, and tube feedings.   Durable power of  for healthcare (DPAHC):  This is a written record that states who you want to make healthcare choices for you when you are unable to make them for yourself. This person, called a proxy, is usually a family member or a friend. You may choose more than 1 proxy.  Do not resuscitate (DNR) order:  A DNR order is used in case your heart stops beating or you stop breathing. It is a request not to have certain forms of treatment, such as CPR. A DNR order may be included in other types of advance directives.  Medical directive:  This covers the care that you want if you are in a coma, near death, or unable to make decisions for yourself. You can list the treatments you want for each condition. Treatment may include pain medicine, surgery, blood transfusions, dialysis, IV or tube feedings, and a ventilator (breathing machine).  Values history:  This document has questions about your views, beliefs, and how you feel and think about life. This information can help others choose the care that you would choose.  Why are advance directives important?  An advance directive helps you control your care. Although spoken wishes may be used, it  is better to have your wishes written down. Spoken wishes can be misunderstood, or not followed. Treatments may be given even if you do not want them. An advance directive may make it easier for your family to make difficult choices about your care.   Urinary Incontinence   Urinary incontinence (UI)  is when you lose control of your bladder. UI develops because your bladder cannot store or empty urine properly. The 3 most common types of UI are stress incontinence, urge incontinence, or both.  Medicines:   May be given to help strengthen your bladder control. Report any side effects of medication to your healthcare provider.  Do pelvic muscle exercises often:  Your pelvic muscles help you stop urinating. Squeeze these muscles tight for 5 seconds, then relax for 5 seconds. Gradually work up to squeezing for 10 seconds. Do 3 sets of 15 repetitions a day, or as directed. This will help strengthen your pelvic muscles and improve bladder control.  Train your bladder:  Go to the bathroom at set times, such as every 2 hours, even if you do not feel the urge to go. You can also try to hold your urine when you feel the urge to go. For example, hold your urine for 5 minutes when you feel the urge to go. As that becomes easier, hold your urine for 10 minutes.   Self-care:   Keep a UI record.  Write down how often you leak urine and how much you leak. Make a note of what you were doing when you leaked urine.  Drink liquids as directed. You may need to limit the amount of liquid you drink to help control your urine leakage. Do not drink any liquid right before you go to bed. Limit or do not have drinks that contain caffeine or alcohol.   Prevent constipation.  Eat a variety of high-fiber foods. Good examples are high-fiber cereals, beans, vegetables, and whole-grain breads. Walking is the best way to trigger your intestines to have a bowel movement.  Exercise regularly and maintain a healthy weight.  Weight loss and exercise  will decrease pressure on your bladder and help you control your leakage.   Use a catheter as directed  to help empty your bladder. A catheter is a tiny, plastic tube that is put into your bladder to drain your urine.   Go to behavior therapy as directed.  Behavior therapy may be used to help you learn to control your urge to urinate.     © Copyright [employer] 2018 Information is for End User's use only and may not be sold, redistributed or otherwise used for commercial purposes. All illustrations and images included in CareNotes® are the copyrighted property of [employer] Inc[employer] or [employer]

## 2024-09-19 NOTE — PROGRESS NOTES
Ambulatory Visit  Name: Marjan Ramos      : 1960      MRN: [id number]  Encounter Provider: Indra Membreno DO  Encounter Date: 2024   Encounter department: [facility]    Assessment & Plan      Depression Screening and Follow-up Plan: Patient's depression screening was positive with a PHQ-9 score of 13. Continue regular follow-up with their mental health provider who is managing their mental health condition(s).       Preventive health issues were discussed with patient, and age appropriate screening tests were ordered as noted in patient's After Visit Summary. Personalized health advice and appropriate referrals for health education or preventive services given if needed, as noted in patient's After Visit Summary.    History of Present Illness     HPI   Patient Care Team:  Indra Membreno DO as PCP - General  MD Stefani Croft PA-C Hugh Daniel O'Donnell, MD    Review of Systems  Medical History Reviewed by provider this encounter:       Annual Wellness Visit Questionnaire   Marjan is here for her Subsequent Wellness visit. Last Medicare Wellness visit information reviewed, patient interviewed and updates made to the record today.      Health Risk Assessment:   Patient rates overall health as fair. Patient feels that their physical health rating is slightly worse. Patient is satisfied with their life. Eyesight was rated as same. Hearing was rated as same. Patient feels that their emotional and mental health rating is same. Patients states they are sometimes angry. Patient states they are always unusually tired/fatigued. Pain experienced in the last 7 days has been a lot. Patient's pain rating has been 3/10. Patient states that she has experienced weight loss or gain in last 6 months.     Depression Screening:   PHQ-9 Score: 13      Fall Risk Screening:   In the past year, patient has experienced: no history of falling in past year      Urinary Incontinence  Screening:   Patient has leaked urine accidently in the last six months.     Home Safety:  Patient has trouble with stairs inside or outside of their home. Patient has working smoke alarms and has working carbon monoxide detector. Home safety hazards include: none.     Nutrition:   Current diet is Regular.     Medications:   Patient is currently taking over-the-counter supplements. OTC medications include: see medication list. Patient is able to manage medications.     Activities of Daily Living (ADLs)/Instrumental Activities of Daily Living (IADLs):   Walk and transfer into and out of bed and chair?: Yes  Dress and groom yourself?: Yes    Bathe or shower yourself?: Yes    Feed yourself? Yes  Do your laundry/housekeeping?: Yes  Manage your money, pay your bills and track your expenses?: Yes  Make your own meals?: Yes    Do your own shopping?: Yes    Previous Hospitalizations:   Any hospitalizations or ED visits within the last 12 months?: Yes    How many hospitalizations have you had in the last year?: 1-2    Hospitalization Comments: ER [facility]    Advance Care Planning:   Living will: Yes    Durable POA for healthcare: Yes    Advanced directive: Yes      Cognitive Screening:   Provider or family/friend/caregiver concerned regarding cognition?: No    PREVENTIVE SCREENINGS      Cardiovascular Screening:    General: Screening Not Indicated, History Lipid Disorder and Risks and Benefits Discussed    Due for: Lipid Panel      Diabetes Screening:     General: Risks and Benefits Discussed and Screening Current    Due for: Blood Glucose      Colorectal Cancer Screening:     General: Screening Current    Due for: Colonoscopy - High Risk      Breast Cancer Screening:     General: Screening Current and Risks and Benefits Discussed    Due for: Mammogram        Cervical Cancer Screening:    General: Risks and Benefits Discussed and Patient Declines      Osteoporosis Screening:    General: Risks and Benefits Discussed and  "Screening Current      Abdominal Aortic Aneurysm (AAA) Screening:        General: Risks and Benefits Discussed and Screening Not Indicated      Lung Cancer Screening:     General: Screening Not Indicated and Risks and Benefits Discussed      Hepatitis C Screening:    General: Screening Current and Risks and Benefits Discussed    Screening, Brief Intervention, and Referral to Treatment (SBIRT)    Screening  Typical number of drinks in a day: 0  Typical number of drinks in a week: 0  Interpretation: Low risk drinking behavior.    AUDIT-C Screenin) How often did you have a drink containing alcohol in the past year? never  2) How many drinks did you have on a typical day when you were drinking in the past year? 0  3) How often did you have 6 or more drinks on one occasion in the past year? never    AUDIT-C Score: 0  Interpretation: Score 0-2 (female): Negative screen for alcohol misuse    Single Item Drug Screening:  How often have you used an illegal drug (including marijuana) or a prescription medication for non-medical reasons in the past year? never    Single Item Drug Screen Score: 0  Interpretation: Negative screen for possible drug use disorder    Brief Intervention  Alcohol & drug use screenings were reviewed. No concerns regarding substance use disorder identified.     Other Counseling Topics:   Car/seat belt/driving safety, skin self-exam, sunscreen and calcium and vitamin D intake and regular weightbearing exercise.     Social Determinants of Health     Financial Resource Strain: Low Risk  (2023)    Overall Financial Resource Strain (CARDIA)     Difficulty of Paying Living Expenses: Not very hard   Transportation Needs: No Transportation Needs (2023)    PRAPARE - Transportation     Lack of Transportation (Medical): No     Lack of Transportation (Non-Medical): No     No results found.    Objective     BP 94/60   Pulse 70   Temp (!) 97.4 °F (36.3 °C)   Ht 5' 6\" (1.676 m)   Wt 67.4 kg (148 lb " 9.6 oz)   LMP  (LMP Unknown)   SpO2 100%   BMI 23.98 kg/m²     Physical Exam
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
## 2024-10-02 ENCOUNTER — IMMUNIZATIONS (OUTPATIENT)
Dept: FAMILY MEDICINE CLINIC | Facility: CLINIC | Age: 64
End: 2024-10-02
Payer: MEDICARE

## 2024-10-02 DIAGNOSIS — Z23 IMMUNIZATION DUE: Primary | ICD-10-CM

## 2024-10-02 PROCEDURE — G0008 ADMIN INFLUENZA VIRUS VAC: HCPCS

## 2024-10-02 PROCEDURE — 90673 RIV3 VACCINE NO PRESERV IM: CPT

## 2024-10-17 ENCOUNTER — TELEPHONE (OUTPATIENT)
Age: 64
End: 2024-10-17

## 2024-10-17 ENCOUNTER — TELEPHONE (OUTPATIENT)
Dept: FAMILY MEDICINE CLINIC | Facility: CLINIC | Age: 64
End: 2024-10-17

## 2024-10-17 DIAGNOSIS — E78.5 HYPERLIPIDEMIA, UNSPECIFIED HYPERLIPIDEMIA TYPE: Primary | ICD-10-CM

## 2024-10-17 LAB
25(OH)D3+25(OH)D2 SERPL-MCNC: 56 NG/ML (ref 30–100)
ERYTHROCYTE [DISTWIDTH] IN BLOOD BY AUTOMATED COUNT: 12.4 % (ref 12–16)
EST. AVERAGE GLUCOSE BLD GHB EST-MCNC: 103 MG/DL
HBA1C MFR BLD: 5.2 %
HCT VFR BLD AUTO: 38.8 % (ref 35–43)
HGB BLD-MCNC: 13.4 G/DL (ref 11.5–14.5)
MCH RBC QN AUTO: 32.3 PG (ref 26–34)
MCHC RBC AUTO-ENTMCNC: 34.5 G/DL (ref 32–37)
MCV RBC AUTO: 94 FL (ref 80–100)
PLATELET # BLD AUTO: 254 THOU/CMM (ref 140–350)
PMV BLD REES-ECKER: 8.7 FL (ref 7.5–11.3)
RBC # BLD AUTO: 4.14 MILL/CMM (ref 3.7–4.7)
TSH SERPL-ACNC: 1.63 UIU/ML (ref 0.45–5.33)
WBC # BLD AUTO: 4.8 THOU/CMM (ref 4–10)

## 2024-10-17 NOTE — TELEPHONE ENCOUNTER
Patient called in and would like her labs printed out. Patient is going to pick them up in about 15 mins.    Thank you

## 2024-10-17 NOTE — TELEPHONE ENCOUNTER
Spoke with patient,  didn't collect specimen due to insurance not covering.  Patient is requesting a new script be printed with updated code.  Will  script tomorrow.

## 2024-10-17 NOTE — TELEPHONE ENCOUNTER
Pt came in the office and was asking if provider could change the order code for UA w Reflex to Microscope since medicare would not cover it. Please advise.

## 2024-10-18 LAB
ALBUMIN SERPL-MCNC: 3.9 G/DL (ref 3.5–5.7)
ALP SERPL-CCNC: 33 U/L (ref 35–120)
ALT SERPL-CCNC: 13 U/L
ANION GAP SERPL CALCULATED.3IONS-SCNC: 12 MMOL/L (ref 3–11)
AST SERPL-CCNC: 17 U/L
BILIRUB SERPL-MCNC: 0.4 MG/DL (ref 0.2–1)
BUN SERPL-MCNC: 14 MG/DL (ref 7–25)
CALCIUM SERPL-MCNC: 9 MG/DL (ref 8.5–10.1)
CHLORIDE SERPL-SCNC: 104 MMOL/L (ref 100–109)
CHOLEST SERPL-MCNC: 210 MG/DL
CHOLEST/HDLC SERPL: 3.5 {RATIO}
CO2 SERPL-SCNC: 29 MMOL/L (ref 21–31)
CREAT SERPL-MCNC: 0.69 MG/DL (ref 0.4–1.1)
CYTOLOGY CMNT CVX/VAG CYTO-IMP: ABNORMAL
GFR/BSA.PRED SERPLBLD CYS-BASED-ARV: 98 ML/MIN/{1.73_M2}
GLUCOSE SERPL-MCNC: 80 MG/DL (ref 65–99)
HDLC SERPL-MCNC: 60 MG/DL (ref 23–92)
LDLC SERPL CALC-MCNC: 132 MG/DL
NONHDLC SERPL-MCNC: 150 MG/DL
POTASSIUM SERPL-SCNC: 4 MMOL/L (ref 3.5–5.2)
PROT SERPL-MCNC: 6.2 G/DL (ref 6.3–8.3)
SODIUM SERPL-SCNC: 145 MMOL/L (ref 135–145)
TRIGL SERPL-MCNC: 90 MG/DL

## 2024-10-21 LAB
CAOX CRY #/AREA URNS HPF: PRESENT /[HPF]
GLUCOSE UR QL STRIP: NEGATIVE MG/DL
HGB UR QL STRIP: NEGATIVE MG/DL
KETONES UR QL STRIP: NEGATIVE MG/DL
LEUKOCYTE ESTERASE UR QL STRIP: ABNORMAL /UL
MUCOUS THREADS URNS QL MICRO: ABNORMAL
NITRITE UR QL STRIP: NEGATIVE
PH UR: 7 [PH] (ref 4.5–8)
PROT 24H UR-MRATE: NEGATIVE MG/DL
RBC #/AREA URNS HPF: 0 /HPF (ref 0–2)
SL AMB POCT URINE COMMENT: ABNORMAL
SP GR UR: 1.01 (ref 1–1.03)
SQUAMOUS #/AREA URNS HPF: ABNORMAL /LPF (ref 0–5)
TRANS CELLS #/AREA URNS HPF: ABNORMAL /LPF (ref 0–1)
WBC #/AREA URNS HPF: ABNORMAL /HPF (ref 0–5)

## 2024-11-08 ENCOUNTER — TELEPHONE (OUTPATIENT)
Age: 64
End: 2024-11-08

## 2024-11-08 NOTE — TELEPHONE ENCOUNTER
Patient called in regards to her labs that she got done on 10/17 and was informed on providers message.

## 2024-11-18 ENCOUNTER — HOSPITAL ENCOUNTER (OUTPATIENT)
Dept: MAMMOGRAPHY | Facility: MEDICAL CENTER | Age: 64
Discharge: HOME | End: 2024-11-18
Payer: MEDICARE

## 2024-11-18 VITALS — BODY MASS INDEX: 23.78 KG/M2 | WEIGHT: 148 LBS | HEIGHT: 66 IN

## 2024-11-18 DIAGNOSIS — Z12.31 SCREENING MAMMOGRAM, ENCOUNTER FOR: ICD-10-CM

## 2024-11-18 PROCEDURE — 77067 SCR MAMMO BI INCL CAD: CPT

## 2024-11-18 PROCEDURE — 77063 BREAST TOMOSYNTHESIS BI: CPT

## 2024-11-20 ENCOUNTER — RESULTS FOLLOW-UP (OUTPATIENT)
Dept: FAMILY MEDICINE CLINIC | Facility: CLINIC | Age: 64
End: 2024-11-20

## 2024-12-18 NOTE — PSYCH
Lov 09/03/24   Progress Note  Psychotherapy Provided [facility]: Individual Psychotherapy 50 minutes provided today  Goals addressed in session:   Goal #1  D: " I'm so-so"   " I'm getting through each day"   " My Pain is a 4, in my lower spine---I can't even lift things like parts of furniture, or my back is killing me "  [place] Simple [place] Simple Merrill Simple "And, I'm feeling like I'm in a holding -pattern, about my parents"   " My parents were supposed to come [place] here, but they are still in [place] "  Merrill Simple Merrill Simple "My father is 91,and my mother is turning 80 soon"   "Just the thought of being in a plane, gives me claustrophobia and panic---I haven't flown since the , and I WON'T fly!"   " But I will feel guilty if my parents die out there---there's nothing I can do about their decisions'   " They travel easily---maybe they will come to visit here in May, like they've talked about before "   Pt has a constricted, anxious, deer-in-the-headlights affect  Her mood is depressed and anxious  Her appetite and sleep are fair to good  Pt denies GIULIANO Tang [address]  She does get occasional escape-ideation, but has nowhere to go, on her own  Pt has Dependent Personality features,and has lived with either her parents, or her sister Chelsy Walker for many years Ellie Ask now ), or now with her sister Sasha Taylor  Pt has chronic pain,and today it is a 4, mostly in her lower spine, but she also has fibromyalgia throughout her body  Pt is quiet at first then, she is assisted with processing her thoughts, feelings,and behaviors  Pt identifies her terror of flying,and of claustrophobic enclosed small spaces  Pt processes her worries re: her elderly parents, and that she may never see them again,as they chose , many years ago, to live out in the warmth and sun of [place],and to go to the beach often, in their elderly years   Pt processes realistic Mortality issues, Pt thinks parents should come back east , [place], where most of pt's family of origin still lives,and pt would not have to contemplate travelling out to [place]  Pt is assisted with cognitive-reframing,and pt acknowledges that her parents raised a large Voodoo family, they did give unconditional love, they helped each child as needed,and they are entitled to live their final years together wherever they choose  Pt does have one brother out in [place], [address]", who had past D&A issues but is allegedly sober in recent years---he does keep in touch with her parents,and he does give occasional updates to pt  Pt is executor of parents' Will  Pt does not feel confident, re: this responsibility---but she did do it for her sister Yanci's estate, a few years ago, so she will try her best to carry out parent's wishes as written  We review how pt could cognitively- reframe things, she is "Bigger than her Anxiety Cells",and she could do calm, slow, Deep-breathing, take her anxiety medication, and get out to [place] on a flight, IF she absolutely Had to! Perhaps a relative would go with her, such as her nephew who is fearless and pretty confident  We review how "49 yr old Betsey Viramontes" CAN conquer her fears and her panic, IF she has to  Perhaps a short trip, first, with gradual exposure to the stimuli, could help extinguish her fear and anxiety  Pt also contemplates taking a Train , instead of an airplane flight---she likes the idea of being on the ground, better than being up in the air  Pt to continue to weigh the pros and cons of trying to see her parents sometime in the next several months  Pt to do one goal each day,and give self-affirmations  Continues meds as prescribed by physicians  A: Major Depressive Disorder, recurrent, moderate, F33 1; Panic Disorder, F41 0; Generalized Anxiety Disorder, F41 10; Chronic Pain and other medical conditions; and R/O Dependent Personality Disorder  P: Continue Treatment Plan, Meds,and Psychotherapy  Do Deep-breathing and relaxation strategies each day, one goal per day,and self-affirmation each day  Pt to try to come out of herself and consider her 80 yr old parents' needs instead of just her own needs and desires  Will continue to work on this  Pain Scale and Suicide Risk  [facility]: Current Pain Assessment: moderate to severe   On a scale of 0 to 10, the patient rates current pain at 4   Current suicide risk is low   Behavioral Health Treatment Plan [address] [place] [address] [address]: Diagnosis and Treatment Plan explained to patient, patient relates understanding diagnosis and is agreeable to Treatment Plan  Assessment    1  Major depressive disorder, recurrent, moderate (296 32) (F33 1)   2  Panic disorder without agoraphobia (300 01) (F41 0)   3  Generalized anxiety disorder (300 02) (F41 1)   4   Chronic pain (338 29) (G89 29)    Signatures   Electronically signed by : FERNANDA BoudreauxMHCNS-BC; Feb 23 2017  9:30PM EST                       (Author)    Electronically signed by : DEON Boudreaux-BC; Feb 23 2017  9:30PM EST                       (Author)

## 2025-02-14 DIAGNOSIS — M79.7 FIBROMYALGIA: Primary | ICD-10-CM

## 2025-02-14 RX ORDER — AMITRIPTYLINE HYDROCHLORIDE 10 MG/1
10 TABLET ORAL
Qty: 30 TABLET | Refills: 0 | Status: SHIPPED | OUTPATIENT
Start: 2025-02-14

## 2025-03-03 ENCOUNTER — TELEPHONE (OUTPATIENT)
Age: 65
End: 2025-03-03

## 2025-03-12 DIAGNOSIS — M79.7 FIBROMYALGIA: ICD-10-CM

## 2025-03-12 RX ORDER — SODIUM CHLORIDE, SODIUM LACTATE, POTASSIUM CHLORIDE, CALCIUM CHLORIDE 600; 310; 30; 20 MG/100ML; MG/100ML; MG/100ML; MG/100ML
125 INJECTION, SOLUTION INTRAVENOUS CONTINUOUS
Status: CANCELLED | OUTPATIENT
Start: 2025-03-12

## 2025-03-13 ENCOUNTER — ANESTHESIA (OUTPATIENT)
Dept: GASTROENTEROLOGY | Facility: HOSPITAL | Age: 65
End: 2025-03-13
Payer: MEDICARE

## 2025-03-13 ENCOUNTER — ANESTHESIA EVENT (OUTPATIENT)
Dept: GASTROENTEROLOGY | Facility: HOSPITAL | Age: 65
End: 2025-03-13
Payer: MEDICARE

## 2025-03-13 ENCOUNTER — HOSPITAL ENCOUNTER (OUTPATIENT)
Dept: GASTROENTEROLOGY | Facility: HOSPITAL | Age: 65
Setting detail: OUTPATIENT SURGERY
End: 2025-03-13
Attending: INTERNAL MEDICINE
Payer: MEDICARE

## 2025-03-13 VITALS
DIASTOLIC BLOOD PRESSURE: 58 MMHG | TEMPERATURE: 97.8 F | SYSTOLIC BLOOD PRESSURE: 110 MMHG | RESPIRATION RATE: 15 BRPM | OXYGEN SATURATION: 99 % | HEART RATE: 78 BPM

## 2025-03-13 DIAGNOSIS — Z83.719 FAMILY HISTORY OF COLONIC POLYPS: ICD-10-CM

## 2025-03-13 DIAGNOSIS — Z12.11 SCREENING FOR COLON CANCER: ICD-10-CM

## 2025-03-13 DIAGNOSIS — Z80.0 FAMILY HISTORY OF COLON CANCER: ICD-10-CM

## 2025-03-13 PROCEDURE — 88305 TISSUE EXAM BY PATHOLOGIST: CPT | Performed by: PATHOLOGY

## 2025-03-13 PROCEDURE — 45380 COLONOSCOPY AND BIOPSY: CPT | Performed by: INTERNAL MEDICINE

## 2025-03-13 PROCEDURE — 45385 COLONOSCOPY W/LESION REMOVAL: CPT | Performed by: INTERNAL MEDICINE

## 2025-03-13 RX ORDER — SODIUM CHLORIDE, SODIUM LACTATE, POTASSIUM CHLORIDE, CALCIUM CHLORIDE 600; 310; 30; 20 MG/100ML; MG/100ML; MG/100ML; MG/100ML
125 INJECTION, SOLUTION INTRAVENOUS CONTINUOUS
Status: DISCONTINUED | OUTPATIENT
Start: 2025-03-13 | End: 2025-03-17 | Stop reason: HOSPADM

## 2025-03-13 RX ORDER — LIDOCAINE HYDROCHLORIDE 10 MG/ML
INJECTION, SOLUTION EPIDURAL; INFILTRATION; INTRACAUDAL; PERINEURAL AS NEEDED
Status: DISCONTINUED | OUTPATIENT
Start: 2025-03-13 | End: 2025-03-13

## 2025-03-13 RX ORDER — AMITRIPTYLINE HYDROCHLORIDE 10 MG/1
10 TABLET ORAL
Qty: 90 TABLET | Refills: 1 | Status: SHIPPED | OUTPATIENT
Start: 2025-03-13

## 2025-03-13 RX ORDER — PROPOFOL 10 MG/ML
INJECTION, EMULSION INTRAVENOUS CONTINUOUS PRN
Status: DISCONTINUED | OUTPATIENT
Start: 2025-03-13 | End: 2025-03-13

## 2025-03-13 RX ORDER — PROPOFOL 10 MG/ML
INJECTION, EMULSION INTRAVENOUS AS NEEDED
Status: DISCONTINUED | OUTPATIENT
Start: 2025-03-13 | End: 2025-03-13

## 2025-03-13 RX ADMIN — PROPOFOL 30 MG: 10 INJECTION, EMULSION INTRAVENOUS at 07:35

## 2025-03-13 RX ADMIN — PROPOFOL 70 MG: 10 INJECTION, EMULSION INTRAVENOUS at 07:34

## 2025-03-13 RX ADMIN — LIDOCAINE HYDROCHLORIDE 50 MG: 10 SOLUTION INTRAVENOUS at 07:33

## 2025-03-13 RX ADMIN — SODIUM CHLORIDE, SODIUM LACTATE, POTASSIUM CHLORIDE, AND CALCIUM CHLORIDE: .6; .31; .03; .02 INJECTION, SOLUTION INTRAVENOUS at 07:30

## 2025-03-13 RX ADMIN — PROPOFOL 100 MCG/KG/MIN: 10 INJECTION, EMULSION INTRAVENOUS at 07:35

## 2025-03-13 NOTE — ANESTHESIA POSTPROCEDURE EVALUATION
Post-Op Assessment Note    CV Status:  Stable  Pain Score: 0    Pain management: adequate       Mental Status:  Alert and awake   Hydration Status:  Euvolemic   PONV Controlled:  Controlled   Airway Patency:  Patent     Post Op Vitals Reviewed: Yes    No anethesia notable event occurred.    Staff: CRNA           Last Filed PACU Vitals:  Vitals Value Taken Time   Temp 97.8 °F (36.6 °C) 03/13/25 0756   Pulse 78 03/13/25 0756   BP 90/55 03/13/25 0756   Resp 16 03/13/25 0756   SpO2 97 % 03/13/25 0756       Modified Akbar:     Vitals Value Taken Time   Activity 2 03/13/25 0756   Respiration 2 03/13/25 0756   Circulation 2 03/13/25 0756   Consciousness 1 03/13/25 0756   Oxygen Saturation 2 03/13/25 0756     Modified Akbar Score: 9

## 2025-03-13 NOTE — ANESTHESIA PREPROCEDURE EVALUATION
Procedure:  COLONOSCOPY    Relevant Problems   CARDIO   (+) Hyperlipidemia      GI/HEPATIC   (+) Hepatic cyst      MUSCULOSKELETAL   (+) Degeneration of lumbosacral intervertebral disc   (+) Fibromyalgia      NEURO/PSYCH   (+) Anxiety state   (+) Depression   (+) Fibromyalgia   (+) Generalized anxiety disorder   (+) Major depressive disorder, recurrent, in partial remission (HCC)   (+) Major depressive disorder, recurrent, moderate (HCC)   (+) Panic disorder without agoraphobia      Endocrine   (+) Thyroid nodule      Ear/Nose/Throat   (+) Rhinitis      Neurology/Sleep   (+) Sore throat      Rheumatology   (+) Displacement of lumbar intervertebral disc without myelopathy   (+) Herniation of lumbar intervertebral disc with radiculopathy   (+) Temporomandibular dysfunction syndrome      Orthopedic/Musculoskeletal   (+) Lumbar canal stenosis   (+) Lumbar radiculopathy   (+) Osteopenia of multiple sites      FEN/Gastrointestinal   (+) Esophagitis      Other   (+) Fatigue   (+) Vitamin D deficiency   (+) Vitamin deficiency        Physical Exam    Airway    Mallampati score: II  TM Distance: <3 FB  Neck ROM: full     Dental   No notable dental hx     Cardiovascular  Cardiovascular exam normal    Pulmonary  Pulmonary exam normal     Other Findings  post-pubertal.    Anesthesia Plan  ASA Score- 2     Anesthesia Type- IV sedation with anesthesia with ASA Monitors.         Additional Monitors:     Airway Plan:            Plan Factors-Exercise tolerance (METS): >4 METS.    Chart reviewed.   Existing labs reviewed.     Patient is not a current smoker. Patient not instructed to abstain from smoking on day of procedure. Patient did not smoke on day of surgery.            Induction- intravenous.    Postoperative Plan-         Informed Consent- Anesthetic plan and risks discussed with patient.  I personally reviewed this patient with the CRNA. Discussed and agreed on the Anesthesia Plan with the CRNA..      NPO Status:  No vitals  data found for the desired time range.

## 2025-03-13 NOTE — DISCHARGE INSTR - AVS FIRST PAGE
Please call [phone] with any problems.  Your colonoscopy revealed 2 polyps today.  I did remove them.  They are benign.  Suggested repeat examination in 3 years because of your family history and polyps.

## 2025-03-13 NOTE — TELEPHONE ENCOUNTER
Name from pharmacy: AMITRIPTYLINE HCL 10 MG TAB          Will file in chart as: amitriptyline (ELAVIL) 10 mg tablet    Sig: TAKE 1 TABLET BY MOUTH DAILY AT BEDTIME    Disp: 90 tablet    Refills: 1    Start: 3/12/2025    Class: Normal    Non-formulary For: Fibromyalgia    Last ordered: 3 weeks ago (2/14/2025) by Linsey Lawrence DO    Last refill: 2/14/2025    Rx #: [id number]    Pharmacy comment: REQUEST FOR 90 DAYS PRESCRIPTION. DX Code Needed.    Psychiatry:  Antidepressants - Heterocyclics (TCAs) [id number]/12/2025 02:33 PM   Protocol Details Valid encounter within last 6 months      This request has changes from the previous prescription.   To be filled at: [id number] - [place] - [id number]

## 2025-03-13 NOTE — INTERVAL H&P NOTE
H&P reviewed. After examining the patient I find no changes in the patients condition since the H&P had been written.    Vitals:    03/13/25 0652   BP: 133/79   Pulse: 79   Resp: 16   Temp: (!) 97 °F (36.1 °C)   SpO2: 97%

## 2025-03-17 PROCEDURE — 88305 TISSUE EXAM BY PATHOLOGIST: CPT | Performed by: PATHOLOGY

## 2025-03-24 DIAGNOSIS — R53.82 CHRONIC FATIGUE: Primary | ICD-10-CM

## 2025-03-24 NOTE — TELEPHONE ENCOUNTER
Last ordered by another provider. Please review.     Reason for call:   [x] Refill   [] Prior Auth  [] Other:     Office:   [x] PCP/Provider - Indra Membreno  [] Specialty/Provider -     Medication:   ferrous sulfate 325 (65 Fe) mg tablet     Dose/Frequency: Take 1 tablet by mouth daily     Quantity: 30    Pharmacy: [facility]/pharmacy #[id number] - [place], [place] - [address]  [address], [facility] [address]  Phone: [phone]      Local Pharmacy   Does the patient have enough for 3 days?   [] Yes   [x] No - Send as HP to POD    Mail Away Pharmacy   Does the patient have enough for 10 days?   [] Yes   [] No - Send as HP to POD

## 2025-03-26 RX ORDER — FERROUS SULFATE 325(65) MG
1 TABLET ORAL DAILY
Qty: 30 TABLET | Refills: 0 | Status: SHIPPED | OUTPATIENT
Start: 2025-03-26

## 2025-04-18 DIAGNOSIS — R53.82 CHRONIC FATIGUE: ICD-10-CM

## 2025-04-21 RX ORDER — FERROUS SULFATE 325(65) MG
1 TABLET ORAL DAILY
Qty: 90 TABLET | Refills: 1 | Status: SHIPPED | OUTPATIENT
Start: 2025-04-21

## 2025-05-09 ENCOUNTER — TELEPHONE (OUTPATIENT)
Age: 65
End: 2025-05-09
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
## 2025-06-05 ENCOUNTER — TELEPHONE (OUTPATIENT)
Age: 65
End: 2025-06-05

## 2025-06-05 NOTE — TELEPHONE ENCOUNTER
Pt would like script for orthotics faxed to [facility] at [phone]. She has an appointment on 6/10, stated she will no longer be using previous supplier. Please advise, thank you.

## 2025-06-20 ENCOUNTER — OFFICE VISIT (OUTPATIENT)
Dept: FAMILY MEDICINE CLINIC | Facility: CLINIC | Age: 65
End: 2025-06-20
Payer: MEDICARE

## 2025-06-20 VITALS
HEIGHT: 66 IN | DIASTOLIC BLOOD PRESSURE: 68 MMHG | WEIGHT: 148.8 LBS | OXYGEN SATURATION: 96 % | BODY MASS INDEX: 23.91 KG/M2 | TEMPERATURE: 97.9 F | HEART RATE: 82 BPM | SYSTOLIC BLOOD PRESSURE: 110 MMHG

## 2025-06-20 DIAGNOSIS — M54.6 CHRONIC RIGHT-SIDED THORACIC BACK PAIN: Primary | ICD-10-CM

## 2025-06-20 DIAGNOSIS — G89.29 CHRONIC RIGHT-SIDED THORACIC BACK PAIN: Primary | ICD-10-CM

## 2025-06-20 PROCEDURE — 99213 OFFICE O/P EST LOW 20 MIN: CPT | Performed by: FAMILY MEDICINE

## 2025-06-20 PROCEDURE — G2211 COMPLEX E/M VISIT ADD ON: HCPCS | Performed by: FAMILY MEDICINE

## 2025-06-20 RX ORDER — MELOXICAM 15 MG/1
15 TABLET ORAL DAILY
COMMUNITY

## 2025-06-29 ENCOUNTER — HOSPITAL ENCOUNTER (EMERGENCY)
Facility: HOSPITAL | Age: 65
Discharge: HOME | End: 2025-06-29
Attending: EMERGENCY MEDICINE | Admitting: EMERGENCY MEDICINE
Payer: MEDICARE

## 2025-06-29 ENCOUNTER — APPOINTMENT (EMERGENCY)
Dept: CT IMAGING | Facility: HOSPITAL | Age: 65
End: 2025-06-29
Payer: MEDICARE

## 2025-06-29 VITALS
RESPIRATION RATE: 18 BRPM | HEART RATE: 90 BPM | WEIGHT: 149.69 LBS | BODY MASS INDEX: 24.16 KG/M2 | TEMPERATURE: 97.6 F | OXYGEN SATURATION: 100 % | DIASTOLIC BLOOD PRESSURE: 74 MMHG | SYSTOLIC BLOOD PRESSURE: 107 MMHG
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
## 2025-06-29 DIAGNOSIS — S20.212A RIB CONTUSION, LEFT, INITIAL ENCOUNTER: ICD-10-CM

## 2025-06-29 LAB
ANION GAP SERPL CALCULATED.3IONS-SCNC: 5 MMOL/L (ref 4–13)
BUN SERPL-MCNC: 20 MG/DL (ref 5–25)
CALCIUM SERPL-MCNC: 9 MG/DL (ref 8.4–10.2)
CHLORIDE SERPL-SCNC: 105 MMOL/L (ref 96–108)
CO2 SERPL-SCNC: 32 MMOL/L (ref 21–32)
CREAT SERPL-MCNC: 0.66 MG/DL (ref 0.6–1.3)
GFR SERPL CREATININE-BSD FRML MDRD: 94 ML/MIN/1.73SQ M
GLUCOSE SERPL-MCNC: 100 MG/DL (ref 65–140)
POTASSIUM SERPL-SCNC: 3.2 MMOL/L (ref 3.5–5.3)
SODIUM SERPL-SCNC: 142 MMOL/L (ref 135–147)

## 2025-06-29 PROCEDURE — 71260 CT THORAX DX C+: CPT

## 2025-06-29 PROCEDURE — 96374 THER/PROPH/DIAG INJ IV PUSH: CPT

## 2025-06-29 PROCEDURE — 80048 BASIC METABOLIC PNL TOTAL CA: CPT

## 2025-06-29 PROCEDURE — 74177 CT ABD & PELVIS W/CONTRAST: CPT

## 2025-06-29 PROCEDURE — 99285 EMERGENCY DEPT VISIT HI MDM: CPT | Performed by: EMERGENCY MEDICINE

## 2025-06-29 PROCEDURE — 99284 EMERGENCY DEPT VISIT MOD MDM: CPT

## 2025-06-29 PROCEDURE — 36415 COLL VENOUS BLD VENIPUNCTURE: CPT

## 2025-06-29 RX ORDER — LIDOCAINE 50 MG/G
1 PATCH TOPICAL DAILY
Qty: 5 PATCH | Refills: 0 | Status: SHIPPED | OUTPATIENT
Start: 2025-06-29

## 2025-06-29 RX ORDER — KETOROLAC TROMETHAMINE 30 MG/ML
15 INJECTION, SOLUTION INTRAMUSCULAR; INTRAVENOUS ONCE
Status: COMPLETED | OUTPATIENT
Start: 2025-06-29 | End: 2025-06-29

## 2025-06-29 RX ORDER — NAPROXEN 500 MG/1
500 TABLET ORAL 2 TIMES DAILY WITH MEALS
Qty: 15 TABLET | Refills: 0 | Status: SHIPPED | OUTPATIENT
Start: 2025-06-29 | End: 2025-07-03

## 2025-06-29 RX ORDER — ACETAMINOPHEN 325 MG/1
650 TABLET ORAL ONCE
Status: COMPLETED | OUTPATIENT
Start: 2025-06-29 | End: 2025-06-29

## 2025-06-29 RX ORDER — KETOROLAC TROMETHAMINE 30 MG/ML
15 INJECTION, SOLUTION INTRAMUSCULAR; INTRAVENOUS ONCE
Status: DISCONTINUED | OUTPATIENT
Start: 2025-06-29 | End: 2025-06-29

## 2025-06-29 RX ADMIN — KETOROLAC TROMETHAMINE 15 MG: 30 INJECTION, SOLUTION INTRAMUSCULAR; INTRAVENOUS at 21:05

## 2025-06-29 RX ADMIN — IOHEXOL 100 ML: 350 INJECTION, SOLUTION INTRAVENOUS at 21:43

## 2025-06-29 RX ADMIN — ACETAMINOPHEN 650 MG: 325 TABLET ORAL at 20:46
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
## 2025-06-30 NOTE — ED ATTENDING ATTESTATION
6/29/2025  IVicki DO, saw and evaluated the patient. I have discussed the patient with the resident/non-physician practitioner and agree with the resident's/non-physician practitioner's findings, Plan of Care, and MDM as documented in the resident's/non-physician practitioner's note, except where noted. All available labs and Radiology studies were reviewed.  I was present for key portions of any procedure(s) performed by the resident/non-physician practitioner and I was immediately available to provide assistance.       At this point I agree with the current assessment done in the Emergency Department.  I have conducted an independent evaluation of this patient a history and physical is as follows:        A 65-year-old female with pmhx of depression, anxiety and fibromyalgia; presents with left anterior rib pain following a fall that occurred this morning.  Pt denies head strike and LOC.  Pain worsens with movement and deep inspiration.  Pt denies any other additional injuries; denying recent fever, chills, headache, dizziness, neck pain, back pain, chest pain, dyspnea, abd pain, N/V/D, peripheral edema or rashes.    Physical Exam  General Appearance: alert and oriented, nad, non toxic appearing  Skin:  Warm, dry, intact  HEENT: atraumatic, normocephalic  Neck: Supple, trachea midline  Cardiac: RRR; no murmurs, rub, gallops  Pulmonary: lungs CTAB; no wheezes, rales, rhonchi.  Left anterior-inferior ribs tender to palpation without overlying ecchymosis.   Gastrointestinal: abdomen soft, nontender, nondistended; no guarding or rebound tenderness; good bowel sounds, no mass or bruits  Extremities:  no pedal edema, 2+ pulses; no calf tenderness, no clubbing, no cyanosis  Neuro:  no focal motor or sensory deficits, CN 2-12 grossly intact  Psych:  Normal mood and affect, normal judgement and insight    Assessment and Plan:  Left rib pain, following a fall.  Will check labs and imaging to rule out traumatic  injury.  Will treat symptomatically.         ED Course  ED Course as of 06/29/25 2254   Sun Jun 29, 2025   2253 CT chest abdomen pelvis w contrast  No findings of acute traumatic injury in the chest, abdomen or pelvis.         Critical Care Time  Procedures

## 2025-07-03 ENCOUNTER — OFFICE VISIT (OUTPATIENT)
Dept: FAMILY MEDICINE CLINIC | Facility: CLINIC | Age: 65
End: 2025-07-03
Payer: MEDICARE

## 2025-07-03 VITALS
TEMPERATURE: 96.9 F | HEART RATE: 72 BPM | BODY MASS INDEX: 24.17 KG/M2 | HEIGHT: 66 IN | DIASTOLIC BLOOD PRESSURE: 60 MMHG | OXYGEN SATURATION: 99 % | RESPIRATION RATE: 16 BRPM | WEIGHT: 150.4 LBS | SYSTOLIC BLOOD PRESSURE: 94 MMHG

## 2025-07-03 DIAGNOSIS — S70.02XD CONTUSION OF LEFT HIP, SUBSEQUENT ENCOUNTER: ICD-10-CM

## 2025-07-03 DIAGNOSIS — S20.212D CONTUSION OF RIB ON LEFT SIDE, SUBSEQUENT ENCOUNTER: Primary | ICD-10-CM

## 2025-07-03 PROCEDURE — 99214 OFFICE O/P EST MOD 30 MIN: CPT | Performed by: FAMILY MEDICINE

## 2025-07-03 PROCEDURE — G2211 COMPLEX E/M VISIT ADD ON: HCPCS | Performed by: FAMILY MEDICINE

## 2025-07-03 RX ORDER — METHOCARBAMOL 500 MG/1
500 TABLET, FILM COATED ORAL
COMMUNITY

## 2025-07-03 NOTE — PROGRESS NOTES
":  Assessment & Plan  Contusion of rib on left side, subsequent encounter  Patient to continue present treatment with meloxicam, methocarbamol as needed and Lidoderm patches.  Patient may apply ice alternating with heat as needed.  Return to the office as needed.       Contusion of left hip, subsequent encounter  Patient to continue present treatment with meloxicam and may apply ice as needed.           History of Present Illness     Marjan Ramos is a 65 y.o. female   Patient is being seen in follow-up from ER visit at [facility] on 6/29/2025 for fall as patient tripped landing on her left side.  Patient complains of left ribs and left hip pain.  Patient had CT of chest abdomen and pelvis which was negative for fracture.  Patient is feeling somewhat better overall although admits to ongoing pain with certain movements.    Chest Pain   This is a new problem. The current episode started in the past 7 days. The problem occurs constantly. The problem has been gradually improving. The pain is present in the lateral region (left ribs). The quality of the pain is described as sharp. The pain does not radiate. Pertinent negatives include no abdominal pain, back pain, exertional chest pressure, orthopnea, palpitations, PND or shortness of breath. The pain is aggravated by coughing, deep breathing, lifting and movement. She has tried NSAIDs and acetaminophen (ice) for the symptoms. The treatment provided mild relief.     Review of Systems   Respiratory:  Negative for shortness of breath.    Cardiovascular:  Positive for chest pain. Negative for palpitations, orthopnea and PND.   Gastrointestinal:  Negative for abdominal pain.   Musculoskeletal:  Negative for back pain.     Objective   BP 94/60   Pulse 72   Temp (!) 96.9 °F (36.1 °C)   Resp 16   Ht 5' 6\" (1.676 m)   Wt 68.2 kg (150 lb 6.4 oz)   LMP  (LMP Unknown)   SpO2 99%   BMI 24.28 kg/m²      Physical Exam  Constitutional:       General: She is not in " acute distress.     Appearance: Normal appearance.   HENT:      Head: Normocephalic.      Mouth/Throat:      Mouth: Mucous membranes are moist.     Eyes:      General: No scleral icterus.     Conjunctiva/sclera: Conjunctivae normal.       Cardiovascular:      Rate and Rhythm: Normal rate and regular rhythm.   Pulmonary:      Effort: Pulmonary effort is normal.      Breath sounds: Normal breath sounds.      Comments: Reproducible left lower anterior lateral ribs to palpation.  Chest:      Chest wall: Tenderness present.   Abdominal:      Palpations: Abdomen is soft.      Tenderness: There is no abdominal tenderness.     Musculoskeletal:         General: Tenderness present.      Cervical back: Neck supple.      Right lower leg: No edema.      Left lower leg: No edema.      Comments: Left hip tenderness and ecchymosis laterally.  Negative straight leg raise and negative KAT tenderness.   Lymphadenopathy:      Cervical: No cervical adenopathy.     Skin:     General: Skin is warm and dry.     Neurological:      General: No focal deficit present.      Mental Status: She is alert and oriented to person, place, and time.     Psychiatric:         Mood and Affect: Mood normal.         Behavior: Behavior normal.         Thought Content: Thought content normal.         Judgment: Judgment normal.

## 2025-08-14 ENCOUNTER — OFFICE VISIT (OUTPATIENT)
Dept: FAMILY MEDICINE CLINIC | Facility: CLINIC | Age: 65
End: 2025-08-14
Payer: MEDICARE

## 2025-08-15 ENCOUNTER — TELEPHONE (OUTPATIENT)
Age: 65
End: 2025-08-15